# Patient Record
Sex: FEMALE | Race: OTHER | URBAN - METROPOLITAN AREA
[De-identification: names, ages, dates, MRNs, and addresses within clinical notes are randomized per-mention and may not be internally consistent; named-entity substitution may affect disease eponyms.]

---

## 2017-05-23 ENCOUNTER — OFFICE VISIT (OUTPATIENT)
Dept: FAMILY MEDICINE CLINIC | Age: 67
End: 2017-05-23

## 2017-05-23 VITALS
DIASTOLIC BLOOD PRESSURE: 70 MMHG | HEART RATE: 67 BPM | TEMPERATURE: 97.6 F | BODY MASS INDEX: 35.22 KG/M2 | SYSTOLIC BLOOD PRESSURE: 130 MMHG | RESPIRATION RATE: 17 BRPM | WEIGHT: 191.4 LBS | OXYGEN SATURATION: 96 % | HEIGHT: 62 IN

## 2017-05-23 DIAGNOSIS — I10 ESSENTIAL HYPERTENSION: ICD-10-CM

## 2017-05-23 DIAGNOSIS — E78.2 MIXED HYPERCHOLESTEROLEMIA AND HYPERTRIGLYCERIDEMIA: Primary | ICD-10-CM

## 2017-05-23 DIAGNOSIS — R73.9 ELEVATED BLOOD SUGAR: ICD-10-CM

## 2017-05-23 DIAGNOSIS — R00.0 SINUS TACHYCARDIA: ICD-10-CM

## 2017-05-23 DIAGNOSIS — Z85.3 HISTORY OF BREAST CANCER: ICD-10-CM

## 2017-05-23 DIAGNOSIS — F41.0 PANIC ATTACK: ICD-10-CM

## 2017-05-23 PROBLEM — Z90.13 S/P BILATERAL MASTECTOMY: Status: ACTIVE | Noted: 2017-05-23

## 2017-05-23 RX ORDER — ALPRAZOLAM 0.5 MG/1
0.5 TABLET ORAL DAILY
COMMUNITY
Start: 2014-12-19 | End: 2017-05-23 | Stop reason: SDUPTHER

## 2017-05-23 RX ORDER — MELOXICAM 7.5 MG/1
7.5 TABLET ORAL DAILY
COMMUNITY
Start: 2016-09-12 | End: 2017-06-07 | Stop reason: ALTCHOICE

## 2017-05-23 RX ORDER — ALPRAZOLAM 0.5 MG/1
TABLET ORAL
COMMUNITY
End: 2017-05-23 | Stop reason: SDUPTHER

## 2017-05-23 RX ORDER — FLAXSEED OIL 1000 MG
1 CAPSULE ORAL DAILY
COMMUNITY
End: 2017-06-07 | Stop reason: ALTCHOICE

## 2017-05-23 RX ORDER — ATORVASTATIN CALCIUM 40 MG/1
40 TABLET, FILM COATED ORAL DAILY
COMMUNITY
Start: 2014-12-16 | End: 2017-07-12 | Stop reason: SDUPTHER

## 2017-05-23 RX ORDER — ATORVASTATIN CALCIUM 40 MG/1
40 TABLET, FILM COATED ORAL DAILY
COMMUNITY
End: 2017-05-23 | Stop reason: SDUPTHER

## 2017-05-23 RX ORDER — ERGOCALCIFEROL 1.25 MG/1
50000 CAPSULE ORAL
COMMUNITY
Start: 2014-12-16 | End: 2017-06-07 | Stop reason: ALTCHOICE

## 2017-05-23 RX ORDER — ALPRAZOLAM 0.5 MG/1
0.5 TABLET ORAL DAILY
Qty: 30 TAB | Refills: 0 | Status: SHIPPED | OUTPATIENT
Start: 2017-05-23

## 2017-05-23 RX ORDER — DILTIAZEM HYDROCHLORIDE 120 MG/1
120 CAPSULE, COATED, EXTENDED RELEASE ORAL DAILY
COMMUNITY
Start: 2016-10-07 | End: 2017-05-23 | Stop reason: SDUPTHER

## 2017-05-23 RX ORDER — HYDROCORTISONE ACETATE 25 MG/1
25 SUPPOSITORY RECTAL AS NEEDED
COMMUNITY
Start: 2014-12-16 | End: 2017-06-07 | Stop reason: ALTCHOICE

## 2017-05-23 RX ORDER — GUAIFENESIN 100 MG/5ML
81 LIQUID (ML) ORAL DAILY
COMMUNITY
Start: 2007-12-12 | End: 2018-07-24 | Stop reason: ALTCHOICE

## 2017-05-23 RX ORDER — VALSARTAN AND HYDROCHLOROTHIAZIDE 320; 25 MG/1; MG/1
1 TABLET, FILM COATED ORAL DAILY
COMMUNITY
Start: 2014-12-16 | End: 2017-07-12 | Stop reason: SDUPTHER

## 2017-05-23 RX ORDER — METAPROTERENOL SULFATE 20 MG
1 TABLET ORAL DAILY
COMMUNITY
End: 2020-10-28

## 2017-05-23 RX ORDER — DILTIAZEM HYDROCHLORIDE 180 MG/1
180 CAPSULE, COATED, EXTENDED RELEASE ORAL DAILY
Qty: 30 CAP | Refills: 0 | Status: SHIPPED | OUTPATIENT
Start: 2017-05-23 | End: 2017-06-07 | Stop reason: SDUPTHER

## 2017-05-23 RX ORDER — ALBUTEROL SULFATE 90 UG/1
2 AEROSOL, METERED RESPIRATORY (INHALATION)
COMMUNITY
Start: 2014-06-23 | End: 2017-12-11 | Stop reason: SDUPTHER

## 2017-05-23 NOTE — MR AVS SNAPSHOT
Visit Information Date & Time Provider Department Dept. Phone Encounter #  
 5/23/2017  1:45 PM Aracelis Capps, 3 [facility] [phone] [id number] Upcoming Health Maintenance Date Due  
 BREAST CANCER SCRN MAMMOGRAM 2/13/2000 FOBT Q 1 YEAR AGE 50-75 2/13/2000 Pneumococcal 65+ Low/Medium Risk (1 of 2 - PCV13) 2/13/2015 MEDICARE YEARLY EXAM 2/13/2015 INFLUENZA AGE 9 TO ADULT 8/1/2017 GLAUCOMA SCREENING Q2Y 8/1/2018 DTaP/Tdap/Td series (2 - Td) 5/23/2027 Allergies as of 5/23/2017  Review Complete On: 5/23/2017 By: Aracelis Capps MD  
  
 Severity Noted Reaction Type Reactions Biaxin [Clarithromycin] Medium 05/23/2017    Unknown (comments) \"spacey\" feeling Sulfa (Sulfonamide Antibiotics) Low 05/23/2017    Other (comments) Headaches Current Immunizations  Never Reviewed No immunizations on file. Not reviewed this visit You Were Diagnosed With   
  
 Codes Comments Mixed hypercholesterolemia and hypertriglyceridemia    -  Primary ICD-10-CM: Y39.0 ICD-9-CM: 272.2 Elevated blood sugar     ICD-10-CM: R73.9 ICD-9-CM: 790.29 History of breast cancer     ICD-10-CM: Z85.3 ICD-9-CM: V10.3 Essential hypertension     ICD-10-CM: I10 
ICD-9-CM: 401.9 Sinus tachycardia     ICD-10-CM: R00.0 ICD-9-CM: 427.89 Vitals BP Pulse Temp Resp Height(growth percentile) Weight(growth percentile) 130/70 (BP 1 Location: Right arm, BP Patient Position: Sitting) 67 97.6 °F (36.4 °C) (Oral) 17 5' 2.25\" (1.581 m) 191 lb 6.4 oz (86.8 kg) SpO2 BMI Smoking Status 96% 34.73 kg/m2 Never Smoker Vitals History BMI and BSA Data Body Mass Index Body Surface Area 34.73 kg/m 2 1.95 m 2 Preferred Pharmacy Pharmacy Name Phone [address] [facility] loboFelisa cortez [facility] [phone] Your Updated Medication List  
  
   
 This list is accurate as of: 5/23/17  2:34 PM.  Always use your most recent med list.  
  
  
  
  
 albuterol 90 mcg/actuation inhaler Commonly known as:  PROVENTIL HFA, VENTOLIN HFA, PROAIR HFA Take 2 Puffs by inhalation every four (4) hours as needed. ALPRAZolam 0.5 mg tablet Commonly known as:   Beck Take 1 Tab by mouth daily. Max Daily Amount: 0.5 mg.  
  
 aspirin 81 mg chewable tablet Take 81 mg by mouth daily. atorvastatin 40 mg tablet Commonly known as:  LIPITOR Take 40 mg by mouth daily. COREG PO Take  by mouth. dilTIAZem  mg ER capsule Commonly known as:  CARDIZEM CD Take 1 Cap by mouth daily. ergocalciferol 50,000 unit capsule Commonly known as:  ERGOCALCIFEROL Take 50,000 Units by mouth every seven (7) days. flaxseed oil 1,000 mg Cap Take 1 Tab by mouth daily. Llvdjxxd-Biph-Ijillg-Hyalur Ac 597-687-60-2 mg Cap Take 1 Tab by mouth daily. hydrocortisone 25 mg Supp Commonly known as:  ANUSOL-HC Insert 25 mg into rectum as needed. meloxicam 7.5 mg tablet Commonly known as:  MOBIC Take 7.5 mg by mouth daily. MULTIVITAMIN & MINERAL FORMULA PO Take 1 Tab by mouth daily. valsartan-hydroCHLOROthiazide 320-25 mg per tablet Commonly known as:  DIOVAN-HCT Take 1 Tab by mouth daily. Prescriptions Printed Refills ALPRAZolam (XANAX) 0.5 mg tablet 0 Sig: Take 1 Tab by mouth daily. Max Daily Amount: 0.5 mg.  
 Class: Print Route: Oral  
  
Prescriptions Sent to Pharmacy Refills  
 dilTIAZem CD (CARDIZEM CD) 180 mg ER capsule 0 Sig: Take 1 Cap by mouth daily. Class: Normal  
 Pharmacy: [address] [facility], [address] [place] [address] Ph #: [phone] Route: Oral  
  
To-Do List   
 05/23/2017 Lab:  CBC W/O DIFF   
  
 05/23/2017 Lab:  HEMOGLOBIN A1C W/O EAG   
  
 05/23/2017 Lab:  LIPID PANEL   
  
 05/23/2017 Lab: METABOLIC PANEL, COMPREHENSIVE Patient Instructions Diltiazem (By mouth) Diltiazem (baj-ZDM-c-zem) Treats high blood pressure and angina (chest pain). This medicine is a calcium channel blocker. Brand Name(s): Cardizem, Cardizem CD, Cardizem LA, Cartia XT, Dilt-XR, Matzim [place], [place], [address],[facility] There may be other brand names for this medicine. When This Medicine Should Not Be Used: This medicine is not right for everyone. Do not use it if you had an allergic reaction to diltiazem or similar medicines. How to Use This Medicine:  
Long Acting Capsule, 12 Hour Capsule, 24 Hour Capsule, Tablet, Long Acting Tablet · Take your medicine as directed. Your dose may need to be changed several times to find what works best for you. · It is best to take this medicine on an empty stomach. · Swallow this medicine whole. Do not crush, break, chew, or open the capsule or tablet. · Missed dose: Take a dose as soon as you remember. If it is almost time for your next dose, wait until then and take a regular dose. Do not take extra medicine to make up for a missed dose. · Store the medicine in a closed container at room temperature, away from heat, moisture, and direct light. Drugs and Foods to Avoid: Ask your doctor or pharmacist before using any other medicine, including over-the-counter medicines, vitamins, and herbal products. · Some medicines can affect how diltiazem works. Tell your doctor if you are using the following: 
¨ Buspirone, carbamazepine, cimetidine, clonidine, cyclosporine, digoxin, ivabradine, lovastatin, midazolam, quinidine, rifampin, simvastatin, triazolam 
¨ Other blood pressure medicine, including a beta-blocker Kassi Snooks Warnings While Using This Medicine: · Tell your doctor if you are pregnant or breastfeeding, or if you have kidney disease, liver disease, or digestion problems. Tell your doctor about all heart problems that you have, including heart failure or rhythm problems. · This medicine may cause the following problems: ¨ Slow heartbeat ¨ Worsening of heart failure ¨ Serious skin reactions · This medicine could lower your blood pressure too much, especially when you first use it or if you are dehydrated. Stand or sit up slowly if you feel lightheaded or dizzy. Do not drive or do anything else that could be dangerous until you know how this medicine affects you. · Do not stop using this medicine without asking your doctor, even if you feel well. This medicine will not cure high blood pressure, but it will help keep it in normal range. You may have to take blood pressure medicine for the rest of your life. · Your doctor will do lab tests at regular visits to check on the effects of this medicine. Keep all appointments. · Keep all medicine out of the reach of children. Never share your medicine with anyone. Possible Side Effects While Using This Medicine:  
Call your doctor right away if you notice any of these side effects: · Allergic reaction: Itching or hives, swelling in your face or hands, swelling or tingling in your mouth or throat, chest tightness, trouble breathing · Blistering, peeling, red skin rash · Dark urine or pale stools, nausea, vomiting, loss of appetite, stomach pain, yellow skin or eyes · Fast, slow, uneven, or pounding heartbeat · Rapid weight gain, swelling in your hands, ankles, or feet If you notice other side effects that you think are caused by this medicine, tell your doctor. Call your doctor for medical advice about side effects. You may report side effects to FDA at 2-030-FDA-8757 © 2017 [address]  Information is for End User's use only and may not be sold, redistributed or otherwise used for commercial purposes. The above information is an  only. It is not intended as medical advice for individual conditions or treatments. Talk to your doctor, nurse or pharmacist before following any medical regimen to see if it is safe and effective for you. Introducing [facility] & HEALTH SERVICES! [employer] introduces House Party patient portal. Now you can access parts of your medical record, email your doctor's office, and request medication refills online. 1. In your internet browser, go to [email or web address] 2. Click on the First Time User? Click Here link in the Sign In box. You will see the New Member Sign Up page. 3. Enter your House Party Access Code exactly as it appears below. You will not need to use this code after youve completed the sign-up process. If you do not sign up before the expiration date, you must request a new code. · House Party Access Code: [email or web address] Expires: 8/21/2017  1:58 PM 
 
4. Enter the last four digits of your Social Security Number (xxxx) and Date of Birth (mm/dd/yyyy) as indicated and click Submit. You will be taken to the next sign-up page. 5. Create a House Party ID. This will be your House Party login ID and cannot be changed, so think of one that is secure and easy to remember. 6. Create a House Party password. You can change your password at any time. 7. Enter your Password Reset Question and Answer. This can be used at a later time if you forget your password. 8. Enter your e-mail address. You will receive e-mail notification when new information is available in [address] 9. Click Sign Up. You can now view and download portions of your medical record. 10. Click the Download Summary menu link to download a portable copy of your medical information. If you have questions, please visit the Frequently Asked Questions section of the [employer] website. Remember, [employer] is NOT to be used for urgent needs. For medical emergencies, dial 911. Now available from your iPhone and Android! Please provide this summary of care documentation to your next provider. Your primary care clinician is listed as Arden Salinas. If you have any questions after today's visit, please call [phone].

## 2017-05-23 NOTE — PROGRESS NOTES
1. Have you been to the ER, urgent care clinic since your last visit? Hospitalized since your last visit? Yes, [facility], transport via nurse to ER Oct 2016      2. Have you seen or consulted any other health care providers outside of the [address] [place] [address] since your last visit? Include any pap smears or colon screening.    Yes, cardiology Dr. [facility] Nov 2016,  Holden Donovan   Last eye exam August 2016 [facility]   , Dr. Marcus Freeman , Dr Zimmerman Feb 2017 GI         Breast Cancer 2013

## 2017-05-23 NOTE — PROGRESS NOTES
Assessment/Plan:    Cy Fu was seen today for establish care. Diagnoses and all orders for this visit:    Mixed hypercholesterolemia and hypertriglyceridemia- cont lipitor.  -     METABOLIC PANEL, COMPREHENSIVE; Future  -     LIPID PANEL; Future    Elevated blood sugar-ck G6T  -     METABOLIC PANEL, COMPREHENSIVE; Future  -     HEMOGLOBIN A1C W/O EAG; Future    History of breast cancer s/p bilat mastectomy femara/tamoxifen. No xrt. Essential hypertension- intol of BB secondary to sweating and fatigue. Will try cardizem with slow wean off coreg. F/u in 2 weeks. Monitor bp at home.   -     CBC W/O DIFF; Future  -     dilTIAZem CD (CARDIZEM CD) 180 mg ER capsule; Take 1 Cap by mouth daily. Sinus tachycardia  - will try dilt. Panic attack  -     ALPRAZolam (XANAX) 0.5 mg tablet; Take 1 Tab by mouth daily. Max Daily Amount: 0.5 mg. The plan was discussed with the patient. The patient verbalized understanding and is in agreement with the plan. All medication potential side effects were discussed with the patient. Health Maintenance: colo -Dr. Shanae Connor 2015. Z3M.- get records  Health Maintenance   Topic Date Due    COLON CANCER SCRN (BARIUM / CT COLO / FLX SIG) Q5  02/13/2000    Pneumococcal 65+ Low/Medium Risk (1 of 2 - PCV13) 02/13/2015    MEDICARE YEARLY EXAM  02/13/2015    INFLUENZA AGE 9 TO ADULT  08/01/2017    GLAUCOMA SCREENING Q2Y  08/01/2018    DTaP/Tdap/Td series (2 - Td) 05/23/2027    Hepatitis C Screening  Completed    OSTEOPOROSIS SCREENING (DEXA)  Completed    ZOSTER VACCINE AGE 60>  Completed       Edgar Giraldo is a 79 y.o.  female and presents with Establish Care     Subjective:  Pt is here to establish care. H/o breast cancer - invasive lobular carcinoma. S/p tamoxifen and femara. and bilat mastectomy. No xrt. Mixed HLD - on lipitor. Osteopenia - due for dexa 12/2017    HTN - on coreg. Has h/o sinus tachy.  Intol of metoprolol and the afib started when she d/c'd it. Saw cards (Dr. Kyrie Lozano) and was started on coreg, but is having sweats in the am and in pm after taking the doses. Saw a GI doctor for the sensation of \"sitting on a golf ball\". States she was told she has skin tags    Anxiety - sleeps well. She describes panic attack like episodes. Very infrequent. Takes xanax prn. .     ROS:  Constitutional: No recent weight change. No weakness/fatigue. No f/c. Skin: No rashes, change in nails/hair, itching   HENT: No HA, dizziness. No hearing loss/tinnitus. No nasal congestion/discharge. Eyes: No change in vision, double/blurred vision or eye pain/redness. Cardiovascular: No CP/palpitations. No STAHL/orthopnea/PND. Respiratory: No cough/sputum, dyspnea, wheezing. Gastointestinal: No dysphagia, reflux. No n/v. No constipation/diarrhea. No melena/rectal bleeding. Genitourinary: No dysuria, urinary hesitancy, nocturia, hematuria. No incontinence. Musculoskeletal: + joint pain/no stiffness. No muscle pain/tenderness. Endo: No heat/cold intolerance, no polyuria/polydypsia. Heme: No h/o anemia. No easy bleeding/bruising. Allergy/Immunology: No seasonal rhinitis. Denies frequent colds, sinus/ear infections. Neurological: No seizures/numbness/weakness. No paresthesias. Psychiatric:  No depression, +anxiety.    PMH:  Past Medical History:   Diagnosis Date    Arthritis     Asthma     Cancer ([address] 2013    breast cancer     Hypercholesterolemia     Hypertension        PSH:  Past Surgical History:   Procedure Laterality Date    BREAST SURGERY PROCEDURE UNLISTED  06/01/2013    HX CHOLECYSTECTOMY  1995    HX GYN  06/2013    masectomoy and reconstruction    HX GYN  2005    lap hysterectomy and lift     HX HEENT  1962    tonsillectomy    HX ORTHOPAEDIC  11/01/2016    Dr Gene Maria finger ganglion cyst     HX ORTHOPAEDIC  1999    bunion surgery         SH:  Social History   Substance Use Topics    Smoking status: Never Smoker    Smokeless tobacco: Never Used    Alcohol use Yes      Comment: social      FH:  Family History   Problem Relation Age of Onset    Hypertension Mother     Kidney Disease Mother     Cancer Father     Heart Disease Brother        Medications/Allergies:    Current Outpatient Prescriptions:     CARVEDILOL (COREG PO), Take  by mouth., Disp: , Rfl:     atorvastatin (LIPITOR) 40 mg tablet, Take 40 mg by mouth daily. , Disp: , Rfl:     ALPRAZolam (XANAX) 0.5 mg tablet, Take  by mouth nightly as needed for Anxiety. , Disp: , Rfl:     VALSARTAN (DIOVAN PO), Take  by mouth., Disp: , Rfl:   Allergies   Allergen Reactions    Biaxin [Clarithromycin] Unknown (comments)     \"spacey\" feeling      Sulfa (Sulfonamide Antibiotics) Other (comments)     Headaches        Objective:  Visit Vitals    /70 (BP 1 Location: Right arm, BP Patient Position: Sitting)    Pulse 67    Temp 97.6 °F (36.4 °C) (Oral)    Resp 17    Ht 5' 2.25\" (1.581 m)    Wt 191 lb 6.4 oz (86.8 kg)    SpO2 96%    BMI 34.73 kg/m2      Constitutional: Well developed, nourished, no distress, alert, obese habitus   HENT: Exterior ears and tympanic membranes normal bilaterally. Supple neck. No thyromegaly or lymphadenopathy. Oropharynx clear and moist mucous membranes. Eyes: Conjunctiva normal. PERRL. Cardiovascular: S1, S2.  RRR. No murmurs/rubs. No thrills palpated. No carotid bruits. Intact distal pulses. No edema. Pulmonary/Chest Wall: No abnormalities on inspection. Clear to auscultation bilaterally. No wheezing/rhonchi. Normal effort. GI: Soft, nontender, nondistended. Normal active bowel sounds. No  masses on palpation. No hepatosplenomegaly. Musculoskeletal: Gait normal.  Joints without deformity/tenderness. Neurological: Appropriate. No focal motor or sensory deficits. Speech normal.   Skin: No lesions/rashes on inspection. Psych: Appropriate affect, judgement and insight. Short-term memory intact.        Reviewed tests in [facility]  Stress test and echo nml 10/2016.

## 2017-05-23 NOTE — PATIENT INSTRUCTIONS
Diltiazem (By mouth)   Diltiazem (vmu-JKD-e-zem)  Treats high blood pressure and angina (chest pain). This medicine is a calcium channel blocker. Brand Name(s): Cardizem, Cardizem CD, Cardizem LA, Cartia XT, Dilt-XR, Matzim LA, Taztia XT, Tiazac   There may be other brand names for this medicine. When This Medicine Should Not Be Used: This medicine is not right for everyone. Do not use it if you had an allergic reaction to diltiazem or similar medicines. How to Use This Medicine:   Long Acting Capsule, 12 Hour Capsule, 24 Hour Capsule, Tablet, Long Acting Tablet  · Take your medicine as directed. Your dose may need to be changed several times to find what works best for you. · It is best to take this medicine on an empty stomach. · Swallow this medicine whole. Do not crush, break, chew, or open the capsule or tablet. · Missed dose: Take a dose as soon as you remember. If it is almost time for your next dose, wait until then and take a regular dose. Do not take extra medicine to make up for a missed dose. · Store the medicine in a closed container at room temperature, away from heat, moisture, and direct light. Drugs and Foods to Avoid:   Ask your doctor or pharmacist before using any other medicine, including over-the-counter medicines, vitamins, and herbal products. · Some medicines can affect how diltiazem works. Tell your doctor if you are using the following:  ¨ Buspirone, carbamazepine, cimetidine, clonidine, cyclosporine, digoxin, ivabradine, lovastatin, midazolam, quinidine, rifampin, simvastatin, triazolam  ¨ Other blood pressure medicine, including a beta-blocker  . Warnings While Using This Medicine:   · Tell your doctor if you are pregnant or breastfeeding, or if you have kidney disease, liver disease, or digestion problems. Tell your doctor about all heart problems that you have, including heart failure or rhythm problems.   · This medicine may cause the following problems:  ¨ Slow heartbeat  ¨ Worsening of heart failure  ¨ Serious skin reactions  · This medicine could lower your blood pressure too much, especially when you first use it or if you are dehydrated. Stand or sit up slowly if you feel lightheaded or dizzy. Do not drive or do anything else that could be dangerous until you know how this medicine affects you. · Do not stop using this medicine without asking your doctor, even if you feel well. This medicine will not cure high blood pressure, but it will help keep it in normal range. You may have to take blood pressure medicine for the rest of your life. · Your doctor will do lab tests at regular visits to check on the effects of this medicine. Keep all appointments. · Keep all medicine out of the reach of children. Never share your medicine with anyone. Possible Side Effects While Using This Medicine:   Call your doctor right away if you notice any of these side effects:  · Allergic reaction: Itching or hives, swelling in your face or hands, swelling or tingling in your mouth or throat, chest tightness, trouble breathing  · Blistering, peeling, red skin rash  · Dark urine or pale stools, nausea, vomiting, loss of appetite, stomach pain, yellow skin or eyes  · Fast, slow, uneven, or pounding heartbeat  · Rapid weight gain, swelling in your hands, ankles, or feet  If you notice other side effects that you think are caused by this medicine, tell your doctor. Call your doctor for medical advice about side effects. You may report side effects to FDA at 9-788-FDA-8545  © 2017 [facility] Information is for End User's use only and may not be sold, redistributed or otherwise used for commercial purposes. The above information is an  only. It is not intended as medical advice for individual conditions or treatments. Talk to your doctor, nurse or pharmacist before following any medical regimen to see if it is safe and effective for you.

## 2017-06-07 ENCOUNTER — OFFICE VISIT (OUTPATIENT)
Dept: FAMILY MEDICINE CLINIC | Age: 67
End: 2017-06-07

## 2017-06-07 VITALS
RESPIRATION RATE: 18 BRPM | DIASTOLIC BLOOD PRESSURE: 84 MMHG | TEMPERATURE: 98.7 F | WEIGHT: 189 LBS | HEART RATE: 86 BPM | OXYGEN SATURATION: 99 % | BODY MASS INDEX: 34.78 KG/M2 | SYSTOLIC BLOOD PRESSURE: 138 MMHG | HEIGHT: 62 IN

## 2017-06-07 DIAGNOSIS — I10 ESSENTIAL HYPERTENSION: Primary | ICD-10-CM

## 2017-06-07 DIAGNOSIS — J06.9 VIRAL URI: ICD-10-CM

## 2017-06-07 DIAGNOSIS — R00.0 SINUS TACHYCARDIA: ICD-10-CM

## 2017-06-07 RX ORDER — DILTIAZEM HYDROCHLORIDE 240 MG/1
240 CAPSULE, COATED, EXTENDED RELEASE ORAL DAILY
Qty: 90 CAP | Refills: 0 | Status: SHIPPED | OUTPATIENT
Start: 2017-06-07 | End: 2017-07-12 | Stop reason: SDUPTHER

## 2017-06-07 NOTE — MR AVS SNAPSHOT
Visit Information Date & Time Provider Department Dept. Phone Encounter #  
 6/7/2017 10:45 AM Pat Phillips, 3 [facility] [phone] [id number] Upcoming Health Maintenance Date Due Pneumococcal 65+ Low/Medium Risk (1 of 2 - PCV13) 2/13/2015 MEDICARE YEARLY EXAM 2/13/2015 INFLUENZA AGE 9 TO ADULT 8/1/2017 GLAUCOMA SCREENING Q2Y 8/1/2018 COLON CANCER SCRN (BARIUM / CT COLO / FLX SIG) Q5 4/2/2019 DTaP/Tdap/Td series (2 - Td) 5/23/2027 Allergies as of 6/7/2017  Review Complete On: 6/7/2017 By: Pat Phillips MD  
  
 Severity Noted Reaction Type Reactions Biaxin [Clarithromycin] Medium 05/23/2017    Unknown (comments) \"spacey\" feeling Sulfa (Sulfonamide Antibiotics) Low 05/23/2017    Other (comments) Headaches Current Immunizations  Never Reviewed Name Date Pneumococcal Polysaccharide (PPSV-23) 7/11/2013 Zoster Vaccine, Live 8/23/2012 Not reviewed this visit You Were Diagnosed With   
  
 Codes Comments Sinus tachycardia    -  Primary ICD-10-CM: R00.0 ICD-9-CM: 427.89 Essential hypertension     ICD-10-CM: I10 
ICD-9-CM: 401.9 Vitals BP Pulse Temp Resp Height(growth percentile) Weight(growth percentile) 138/84 86 98.7 °F (37.1 °C) 18 5' 2.25\" (1.581 m) 189 lb (85.7 kg) SpO2 BMI OB Status Smoking Status 99% 34.29 kg/m2 Postmenopausal Never Smoker Vitals History BMI and BSA Data Body Mass Index Body Surface Area  
 34.29 kg/m 2 1.94 m 2 Preferred Pharmacy Pharmacy Name Phone [address] [facility] Fel[place] [facility] [phone] Your Updated Medication List  
  
   
This list is accurate as of: 6/7/17 11:03 AM.  Always use your most recent med list.  
  
  
  
  
 albuterol 90 mcg/actuation inhaler Commonly known as:  PROVENTIL HFA, VENTOLIN HFA, PROAIR HFA Take 2 Puffs by inhalation every four (4) hours as needed. ALPRAZolam 0.5 mg tablet Commonly known as:  [place] Take 1 Tab by mouth daily. Max Daily Amount: 0.5 mg.  
  
 aspirin 81 mg chewable tablet Take 81 mg by mouth daily. atorvastatin 40 mg tablet Commonly known as:  LIPITOR Take 40 mg by mouth daily. dilTIAZem  mg ER capsule Commonly known as:  CARDIZEM CD Take 1 Cap by mouth daily. Vguhahny-Aoab-Lhyqmo-Hyalur Ac 767-946-61-2 mg Cap Take 1 Tab by mouth daily. MULTIVITAMIN & MINERAL FORMULA PO Take 1 Tab by mouth daily. valsartan-hydroCHLOROthiazide 320-25 mg per tablet Commonly known as:  DIOVAN-HCT Take 1 Tab by mouth daily. Prescriptions Sent to Pharmacy Refills  
 dilTIAZem CD (CARDIZEM CD) 240 mg ER capsule 0 Sig: Take 1 Cap by mouth daily. Class: Normal  
 Pharmacy: [address] [place] [address], [id number] [place] [address] #: [phone] Route: Oral  
  
Introducing \[facility]\"" & HEALTH SERVICES! Dear Denisse Willosn: Thank you for requesting a DinnerTime account. Our records indicate that you already have an active DinnerTime account. You can access your account anytime at [email or web address] Did you know that you can access your hospital and ER discharge instructions at any time in DinnerTime? You can also review all of your test results from your hospital stay or ER visit. Additional Information If you have questions, please visit the Frequently Asked Questions section of the DinnerTime website at [email or web address] Remember, DinnerTime is NOT to be used for urgent needs. For medical emergencies, dial 911. Now available from your iPhone and Android! Please provide this summary of care documentation to your next provider. Your primary care clinician is listed as Arden Salinas. If you have any questions after today's visit, please call [phone].

## 2017-06-07 NOTE — PROGRESS NOTES
Alysa Macias is a 79 y.o. female  Here today for follow-up. Patient also have complaints of cold symptoms        1. Have you been to the ER, urgent care clinic since your last visit? Hospitalized since your last visit? No    2. Have you seen or consulted any other health care providers outside of the [address] [place] [address] since your last visit? Include any pap smears or colon screening.  No

## 2017-06-07 NOTE — PROGRESS NOTES
Assessment/Plan:    1. Essential hypertension and sinus tachy  -incr dose dilt to 240mg. F/u in1 mo. Stop coreg given bb intolerance. - dilTIAZem CD (CARDIZEM CD) 240 mg ER capsule; Take 1 Cap by mouth daily. Dispense: 90 Cap; Refill: 0    2. Viral URI  -no indication for antibiotics at this time. Monitor. The plan was discussed with the patient. The patient verbalized understanding and is in agreement with the plan. All medication potential side effects were discussed with the patient. Health Maintenance:   Health Maintenance   Topic Date Due    Pneumococcal 65+ Low/Medium Risk (1 of 2 - PCV13) 02/13/2015    MEDICARE YEARLY EXAM  02/13/2015    INFLUENZA AGE 9 TO ADULT  08/01/2017    GLAUCOMA SCREENING Q2Y  08/01/2018    COLON CANCER SCRN (BARIUM / CT COLO / FLX SIG) Q5  04/02/2019    DTaP/Tdap/Td series (2 - Td) 05/23/2027    Hepatitis C Screening  Completed    OSTEOPOROSIS SCREENING (DEXA)  Completed    ZOSTER VACCINE AGE 60>  Completed       Cristina Nix is a 79 y.o. female and presents with Follow-up     Subjective:  HTN  And sinus tachy -was switched from BB to dilt at last visit b/c of intolerance to BB (failed coreg and metoprolol). The sx of fatigue and sweating has improved with decreased dose of coreg (we started slow wean last visit)    Notes wheezing, minimal cough, fatigue, sore throat x several days. +subjective chills. ROS:  Constitutional: No recent weight change. No weakness/fatigue. No f/c. HENT: No HA, dizziness. No hearing loss/tinnitus. No nasal congestion/discharge. Eyes: No change in vision, double/blurred vision or eye pain/redness. Cardiovascular: No CP/palpitations. No STAHL/orthopnea/PND. Respiratory: No cough/sputum, no dyspnea, +wheezing. The problem list was updated as a part of today's visit.   Patient Active Problem List   Diagnosis Code    History of breast cancer Z85.3    Mixed hypercholesterolemia and hypertriglyceridemia E78.2    Elevated blood sugar R73.9    Essential hypertension I10    Sinus tachycardia R00.0    S/P bilateral mastectomy Z90.13     The PSH, FH were reviewed. SH:  Social History   Substance Use Topics    Smoking status: Never Smoker    Smokeless tobacco: Never Used    Alcohol use Yes      Comment: social      Medications/Allergies:  Current Outpatient Prescriptions on File Prior to Visit   Medication Sig Dispense Refill    CARVEDILOL (COREG PO) Take  by mouth.  albuterol (PROVENTIL HFA, VENTOLIN HFA, PROAIR HFA) 90 mcg/actuation inhaler Take 2 Puffs by inhalation every four (4) hours as needed.  aspirin 81 mg chewable tablet Take 81 mg by mouth daily.  ergocalciferol (ERGOCALCIFEROL) 50,000 unit capsule Take 50,000 Units by mouth every seven (7) days.  hydrocortisone (ANUSOL-HC) 25 mg supp Insert 25 mg into rectum as needed.  Jcsjjfvk-Qhbd-Uyhcbz-Hyalur Ac 532-057-36-2 mg cap Take 1 Tab by mouth daily.  meloxicam (MOBIC) 7.5 mg tablet Take 7.5 mg by mouth daily.  valsartan-hydroCHLOROthiazide (DIOVAN-HCT) 320-25 mg per tablet Take 1 Tab by mouth daily.  atorvastatin (LIPITOR) 40 mg tablet Take 40 mg by mouth daily.  MULTIVITAMIN WITH MINERALS (MULTIVITAMIN & MINERAL FORMULA PO) Take 1 Tab by mouth daily.  flaxseed oil 1,000 mg cap Take 1 Tab by mouth daily.  dilTIAZem CD (CARDIZEM CD) 180 mg ER capsule Take 1 Cap by mouth daily. 30 Cap 0    ALPRAZolam (XANAX) 0.5 mg tablet Take 1 Tab by mouth daily. Max Daily Amount: 0.5 mg. 30 Tab 0     No current facility-administered medications on file prior to visit.          Allergies   Allergen Reactions    Biaxin [Clarithromycin] Unknown (comments)     \"spacey\" feeling      Sulfa (Sulfonamide Antibiotics) Other (comments)     Headaches        Objective:  Visit Vitals    /84    Pulse 86    Temp 98.7 °F (37.1 °C)    Resp 18    Ht 5' 2.25\" (1.581 m)    Wt 189 lb (85.7 kg)    SpO2 99%    BMI 34.29 kg/m2 Constitutional: Well developed, nourished, no distress, alert   HENT: Exterior ears and tympanic membranes normal bilaterally. Supple neck. No thyromegaly or lymphadenopathy. Oropharynx clear and moist mucous membranes. Eyes: Conjunctiva normal. PERRL. CV: S1, S2.  RRR. No murmurs/rubs. No thrills palpated. No carotid bruits. Intact distal pulses. No edema. Pulm: No abnormalities on inspection. Clear to auscultation bilaterally. No wheezing/rhonchi. Normal effort.

## 2017-06-20 PROBLEM — E55.9 VITAMIN D DEFICIENCY: Status: ACTIVE | Noted: 2017-06-20

## 2017-07-07 ENCOUNTER — OFFICE VISIT (OUTPATIENT)
Dept: FAMILY MEDICINE CLINIC | Age: 67
End: 2017-07-07

## 2017-07-07 VITALS
DIASTOLIC BLOOD PRESSURE: 81 MMHG | RESPIRATION RATE: 20 BRPM | OXYGEN SATURATION: 94 % | BODY MASS INDEX: 34.96 KG/M2 | HEART RATE: 102 BPM | SYSTOLIC BLOOD PRESSURE: 140 MMHG | HEIGHT: 62 IN | WEIGHT: 190 LBS | TEMPERATURE: 98.3 F

## 2017-07-07 DIAGNOSIS — Z00.00 ROUTINE GENERAL MEDICAL EXAMINATION AT A HEALTH CARE FACILITY: ICD-10-CM

## 2017-07-07 DIAGNOSIS — Z13.39 SCREENING FOR ALCOHOLISM: ICD-10-CM

## 2017-07-07 DIAGNOSIS — J45.21 RAD (REACTIVE AIRWAY DISEASE) WITH WHEEZING, MILD INTERMITTENT, WITH ACUTE EXACERBATION: Primary | ICD-10-CM

## 2017-07-07 DIAGNOSIS — R00.0 SINUS TACHYCARDIA: ICD-10-CM

## 2017-07-07 DIAGNOSIS — I10 ESSENTIAL HYPERTENSION: ICD-10-CM

## 2017-07-07 RX ORDER — PREDNISONE 20 MG/1
40 TABLET ORAL
Qty: 10 TAB | Refills: 0 | Status: SHIPPED | OUTPATIENT
Start: 2017-07-07 | End: 2017-07-12

## 2017-07-07 NOTE — PATIENT INSTRUCTIONS
Medicare Part B Preventive Services Limitations Recommendation   Bone Mass Measurement  (age 72 & older, biennial) Requires diagnosis related to osteoporosis or estrogen deficiency. Biennial benefit unless patient has history of long-term glucocorticoid tx or baseline is needed because initial test was by other method 12/2017   Cardiovascular Screening Blood Tests (every 5 years)  Total cholesterol, HDL, Triglycerides Order as a panel if possible due   Colorectal Cancer Screening  -Fecal occult blood test (annual)  -Flexible sigmoidoscopy (5y)  -Screening colonoscopy (10y)  -Barium Enema  2019   Diabetes Screening Tests (at least every 3 years, Medicare covers annually or at 6-month intervals for prediabetic patients)    Fasting blood sugar (FBS) or glucose tolerance test (GTT) Patient must be diagnosed with one of the following:  -Hypertension, Dyslipidemia, obesity, previous impaired FBS or GTT  Or any two of the following: overweight, FH of diabetes, age ? 72, history of gestational diabetes, birth of baby weighing more than 9 pounds due   Pneumococcal Vaccination (once after 72)  Get prevnar 15 from pharmacy

## 2017-07-07 NOTE — PROGRESS NOTES
Chief Complaint   Patient presents with   [address] [place] [address] Annual Wellness Visit     1. Have you been to the ER, urgent care clinic since your last visit? Hospitalized since your last visit? No    2. Have you seen or consulted any other health care providers outside of the [address] [place] [address] since your last visit? Include any pap smears or colon screening.  No

## 2017-07-07 NOTE — MR AVS SNAPSHOT
Visit Information Date & Time Provider Department Dept. Phone Encounter #  
 7/7/2017 10:45 AM Patrick Lares, [id number] [phone] [id number] Upcoming Health Maintenance Date Due Pneumococcal 65+ Low/Medium Risk (1 of 2 - PCV13) 2/13/2015 INFLUENZA AGE 9 TO ADULT 8/1/2017 MEDICARE YEARLY EXAM 7/8/2018 GLAUCOMA SCREENING Q2Y 8/1/2018 COLON CANCER SCRN (BARIUM / CT COLO / FLX SIG) Q5 4/2/2019 DTaP/Tdap/Td series (2 - Td) 5/23/2027 Allergies as of 7/7/2017  Review Complete On: 7/7/2017 By: Patrick Lares MD  
  
 Severity Noted Reaction Type Reactions Biaxin [Clarithromycin] Medium 05/23/2017    Unknown (comments) \"spacey\" feeling Sulfa (Sulfonamide Antibiotics) Low 05/23/2017    Other (comments) Headaches Current Immunizations  Never Reviewed Name Date Pneumococcal Polysaccharide (PPSV-23) 7/11/2013 Zoster Vaccine, Live 8/23/2012 Not reviewed this visit You Were Diagnosed With   
  
 Codes Comments RAD (reactive airway disease) with wheezing, mild intermittent, with acute exacerbation    -  Primary ICD-10-CM: J45.21 ICD-9-CM: 894.63 Routine general medical examination at a health care facility     ICD-10-CM: Z00.00 ICD-9-CM: V70.0 Screening for alcoholism     ICD-10-CM: Z13.89 ICD-9-CM: V79.1 Essential hypertension     ICD-10-CM: I10 
ICD-9-CM: 401.9 Sinus tachycardia     ICD-10-CM: R00.0 ICD-9-CM: 427.89 Vitals BP Pulse Temp Resp Height(growth percentile) Weight(growth percentile) 140/81 (BP 1 Location: Left arm, BP Patient Position: Sitting) (!) 102 98.3 °F (36.8 °C) (Oral) 20 5' 2.25\" (1.581 m) 190 lb (86.2 kg) SpO2 BMI OB Status Smoking Status 94% 34.47 kg/m2 Postmenopausal Never Smoker Vitals History BMI and BSA Data Body Mass Index Body Surface Area 34.47 kg/m 2 1.95 m 2 [facility] [address] [facility] Giovana Villagomez [phone] Your Updated Medication List  
  
   
This list is accurate as of: 7/7/17 11:28 AM.  Always use your most recent med list.  
  
  
  
  
 albuterol 90 mcg/actuation inhaler Commonly known as:  PROVENTIL HFA, VENTOLIN HFA, PROAIR HFA Take 2 Puffs by inhalation every four (4) hours as needed. ALPRAZolam 0.5 mg tablet Commonly known as:  Lamont Ramos Take 1 Tab by mouth daily. Max Daily Amount: 0.5 mg.  
  
 aspirin 81 mg chewable tablet Take 81 mg by mouth daily. atorvastatin 40 mg tablet Commonly known as:  LIPITOR Take 40 mg by mouth daily. dilTIAZem  mg ER capsule Commonly known as:  CARDIZEM CD Take 1 Cap by mouth daily. Qzqfgtvz-Ripv-Efuwwq-Hyalur Ac 207-958-05-2 mg Cap Take 1 Tab by mouth daily. MULTIVITAMIN & MINERAL FORMULA PO Take 1 Tab by mouth daily. predniSONE 20 mg tablet Commonly known as:  Darene Reels Take 2 Tabs by mouth daily (with breakfast) for 5 days. valsartan-hydroCHLOROthiazide 320-25 mg per tablet Commonly known as:  DIOVAN-HCT Take 1 Tab by mouth daily. Prescriptions Sent to Pharmacy Refills  
 predniSONE (DELTASONE) 20 mg tablet 0 Sig: Take 2 Tabs by mouth daily (with breakfast) for 5 days. Class: Normal  
 Pharmacy: [address] [place] [address], [id number] [place] [address] Ph #: [phone] Route: Oral  
  
Patient Instructions Medicare Part B Preventive Services Limitations Recommendation Bone Mass Measurement 
(age 72 & older, biennial) Requires diagnosis related to osteoporosis or estrogen deficiency. Biennial benefit unless patient has history of long-term glucocorticoid tx or baseline is needed because initial test was by other method 12/2017 Cardiovascular Screening Blood Tests (every 5 years) Total cholesterol, HDL, Triglycerides Order as a panel if possible due Colorectal Cancer Screening 
-Fecal occult blood test (annual) -Flexible sigmoidoscopy (5y) 
-Screening colonoscopy (10y) -Barium Enema  2019 Diabetes Screening Tests (at least every 3 years, Medicare covers annually or at 6-month intervals for prediabetic patients) Fasting blood sugar (FBS) or glucose tolerance test (GTT) Patient must be diagnosed with one of the following: 
-Hypertension, Dyslipidemia, obesity, previous impaired FBS or GTT 
Or any two of the following: overweight, FH of diabetes, age ? 72, history of gestational diabetes, birth of baby weighing more than 9 pounds due Pneumococcal Vaccination (once after 65)  Get prevnar 13 from pharmacy [facility]! Dear Stacia Lance: Thank you for requesting a [employer] account. Our records indicate that you already have an active [employer] account. You can access your account anytime at [email or web address] Did you know that you can access your hospital and ER discharge instructions at any time in [employer]? You can also review all of your test results from your hospital stay or ER visit. Additional Information If you have questions, please visit the Frequently Asked Questions section of the [employer] website at [email or web address] Remember, [employer] is NOT to be used for urgent needs. For medical emergencies, dial 911. Now available from your iPhone and Android! Please provide this summary of care documentation to your next provider. Your primary care clinician is listed as Arden Salinas. If you have any questions after today's visit, please call [phone].

## 2017-07-07 NOTE — PROGRESS NOTES
This is an Initial Medicare Annual Wellness Exam (AWV) (Performed 12 months after IPPE or effective date of Medicare Part B enrollment, Once in a lifetime)    I have reviewed the patient's medical history in detail and updated the computerized patient record. History     Past Medical History:   Diagnosis Date    Arthritis     Asthma     Cancer ([address] 2013    breast cancer     Hypercholesterolemia     Hypertension       Past Surgical History:   Procedure Laterality Date    BREAST SURGERY PROCEDURE UNLISTED  06/01/2013    HX CHOLECYSTECTOMY  1995    HX GYN  06/2013    masectomoy and reconstruction    HX GYN  2005    lap hysterectomy and lift     HX HEENT  1962    tonsillectomy    HX ORTHOPAEDIC  11/01/2016    Dr Tiffanie Lopez finger ganglion cyst     HX ORTHOPAEDIC  1999    bunion surgery      Current Outpatient Prescriptions   Medication Sig Dispense Refill    dilTIAZem CD (CARDIZEM CD) 240 mg ER capsule Take 1 Cap by mouth daily. 90 Cap 0    albuterol (PROVENTIL HFA, VENTOLIN HFA, PROAIR HFA) 90 mcg/actuation inhaler Take 2 Puffs by inhalation every four (4) hours as needed.  aspirin 81 mg chewable tablet Take 81 mg by mouth daily.  Eapwacrz-Fhwd-Sngkzh-Hyalur Ac 253-022-34-2 mg cap Take 1 Tab by mouth daily.  valsartan-hydroCHLOROthiazide (DIOVAN-HCT) 320-25 mg per tablet Take 1 Tab by mouth daily.  atorvastatin (LIPITOR) 40 mg tablet Take 40 mg by mouth daily.  MULTIVITAMIN WITH MINERALS (MULTIVITAMIN & MINERAL FORMULA PO) Take 1 Tab by mouth daily.  ALPRAZolam (XANAX) 0.5 mg tablet Take 1 Tab by mouth daily.  Max Daily Amount: 0.5 mg. 30 Tab 0     Allergies   Allergen Reactions    Biaxin [Clarithromycin] Unknown (comments)     \"spacey\" feeling      Sulfa (Sulfonamide Antibiotics) Other (comments)     Headaches      Family History   Problem Relation Age of Onset    Hypertension Mother     Kidney Disease Mother     Cancer Father     Heart Disease Brother      Social History   Substance Use Topics    Smoking status: Never Smoker    Smokeless tobacco: Never Used    Alcohol use Yes      Comment: social      Patient Active Problem List   Diagnosis Code    History of breast cancer Z85.3    Mixed hypercholesterolemia and hypertriglyceridemia E78.2    Elevated blood sugar R73.9    Essential hypertension I10    Sinus tachycardia R00.0    S/P bilateral mastectomy Z90.13    Vitamin D deficiency E55.9       Depression Risk Factor Screening:     PHQ over the last two weeks 7/7/2017   Little interest or pleasure in doing things Not at all   Feeling down, depressed or hopeless Not at all   Total Score PHQ 2 0     Alcohol Risk Factor Screening: On any occasion during the past 3 months, have you had more than 3 drinks containing alcohol? Yes    Do you average more than 7 drinks per week? No      Functional Ability and Level of Safety:     Hearing Loss   normal-to-mild    Activities of Daily Living   Self-care. Requires assistance with: no ADLs    Fall Risk   Fall Risk Assessment, last 12 mths 6/7/2017   Able to walk? Yes   Fall in past 12 months? No     Abuse Screen   Patient is not abused    Review of Systems   A comprehensive review of systems was negative. Physical Examination     Evaluation of Cognitive Function:  Mood/affect:  neutral  Appearance: age appropriate  Cognition: normal    Patient Care Team:  Allie Andres MD as PCP - General (Internal Medicine)    Advice/Referrals/Counseling   Education and counseling provided:  Are appropriate based on today's review and evaluation  End-of-Life planning (with patient's consent). Advance Care Planning (ACP) Provider Conversation Snapshot    Date of ACP Conversation: 07/07/17  Persons included in Conversation:  patient  Length of ACP Conversation in minutes:  <16 minutes (Non-Billable)    Authorized Decision Maker (if patient is incapable of making informed decisions):    This person is:    (POA) is [phone]        For Patients with Decision Making Capacity:   pt is DNR        Assessment/Plan   pt to bring in living will. Assessment/Plan:    1. RAD (reactive airway disease) with wheezing, mild intermittent, with acute exacerbation  - predniSONE (DELTASONE) 20 mg tablet; Take 2 Tabs by mouth daily (with breakfast) for 5 days. Dispense: 10 Tab; Refill: 0    2. Sinus tachycardia  -go back to dilt 240mg. F/u if palpitations persist.    3. Essential hypertension  -see #2  -get labs    4. Routine general medical examination at a health care facility    5. Screening for alcoholism      The plan was discussed with the patient. The patient verbalized understanding and is in agreement with the plan. All medication potential side effects were discussed with the patient. Health Maintenance:   Health Maintenance   Topic Date Due    Pneumococcal 65+ Low/Medium Risk (1 of 2 - PCV13) 02/13/2015    INFLUENZA AGE 9 TO ADULT  08/01/2017    MEDICARE YEARLY EXAM  07/08/2018    GLAUCOMA SCREENING Q2Y  08/01/2018    COLON CANCER SCRN (BARIUM / CT COLO / FLX SIG) Q5  04/02/2019    DTaP/Tdap/Td series (2 - Td) 05/23/2027    Hepatitis C Screening  Completed    OSTEOPOROSIS SCREENING (DEXA)  Completed    ZOSTER VACCINE AGE 60>  Completed       Elyce Due is a 79 y.o. female and presents with Annual Wellness Visit     Subjective:  HTN - notes on 6/28-6/30 she had episodes of sinus tachy. We had weaned off coreg slowly. Last dose around 6/14. She self-decreased back to 180mg dilt. She is intol of BB. Also notes since getting viral UTI, she's had some wheezing and dyspnea. ROS:  Constitutional: No recent weight change. No weakness/fatigue. No f/c. Cardiovascular: No CP/+palpitations. No STAHL/orthopnea/PND. Respiratory: No cough/sputum, +dyspnea, +wheezing. Gastointestinal: No dysphagia, reflux. No n/v. No constipation/diarrhea. No melena/rectal bleeding.      The problem list was updated as a part of today's visit. Patient Active Problem List   Diagnosis Code    History of breast cancer Z85.3    Mixed hypercholesterolemia and hypertriglyceridemia E78.2    Elevated blood sugar R73.9    Essential hypertension I10    Sinus tachycardia R00.0    S/P bilateral mastectomy Z90.13    Vitamin D deficiency E55.9       The PSH, FH were reviewed. SH:  Social History   Substance Use Topics    Smoking status: Never Smoker    Smokeless tobacco: Never Used    Alcohol use Yes      Comment: social        Medications/Allergies:  Current Outpatient Prescriptions on File Prior to Visit   Medication Sig Dispense Refill    dilTIAZem CD (CARDIZEM CD) 240 mg ER capsule Take 1 Cap by mouth daily. 90 Cap 0    albuterol (PROVENTIL HFA, VENTOLIN HFA, PROAIR HFA) 90 mcg/actuation inhaler Take 2 Puffs by inhalation every four (4) hours as needed.  aspirin 81 mg chewable tablet Take 81 mg by mouth daily.  Hpsjjeza-Oltq-Pqpcni-Hyalur Ac 852-663-49-2 mg cap Take 1 Tab by mouth daily.  valsartan-hydroCHLOROthiazide (DIOVAN-HCT) 320-25 mg per tablet Take 1 Tab by mouth daily.  atorvastatin (LIPITOR) 40 mg tablet Take 40 mg by mouth daily.  MULTIVITAMIN WITH MINERALS (MULTIVITAMIN & MINERAL FORMULA PO) Take 1 Tab by mouth daily.  ALPRAZolam (XANAX) 0.5 mg tablet Take 1 Tab by mouth daily. Max Daily Amount: 0.5 mg. 30 Tab 0     No current facility-administered medications on file prior to visit.          Allergies   Allergen Reactions    Biaxin [Clarithromycin] Unknown (comments)     \"spacey\" feeling      Sulfa (Sulfonamide Antibiotics) Other (comments)     Headaches        Objective:  Visit Vitals    /81 (BP 1 Location: Left arm, BP Patient Position: Sitting)    Pulse (!) 102    Temp 98.3 °F (36.8 °C) (Oral)    Resp 20    Ht 5' 2.25\" (1.581 m)    Wt 190 lb (86.2 kg)    SpO2 94%    BMI 34.47 kg/m2      Constitutional: Well developed, nourished, no distress, alert, obese habitus   HENT: Exterior ears and tympanic membranes normal bilaterally. Supple neck. No thyromegaly or lymphadenopathy. Oropharynx clear and moist mucous membranes. No middle ear effusion. Eyes: Conjunctiva normal. PERRL. CV: S1, S2.  RRR. No murmurs/rubs. No thrills palpated. No carotid bruits. Intact distal pulses. No edema. Pulm: No abnormalities on inspection. Scattered few wheezes. Normal effort.

## 2017-07-12 DIAGNOSIS — I10 ESSENTIAL HYPERTENSION: ICD-10-CM

## 2017-07-12 DIAGNOSIS — E78.2 MIXED HYPERCHOLESTEROLEMIA AND HYPERTRIGLYCERIDEMIA: ICD-10-CM

## 2017-07-12 RX ORDER — DILTIAZEM HYDROCHLORIDE 240 MG/1
240 CAPSULE, COATED, EXTENDED RELEASE ORAL DAILY
Qty: 90 CAP | Refills: 1 | Status: SHIPPED | OUTPATIENT
Start: 2017-07-12 | End: 2018-01-16 | Stop reason: SDUPTHER

## 2017-07-12 RX ORDER — VALSARTAN AND HYDROCHLOROTHIAZIDE 320; 25 MG/1; MG/1
1 TABLET, FILM COATED ORAL DAILY
Qty: 90 TAB | Refills: 1 | Status: SHIPPED | OUTPATIENT
Start: 2017-07-12 | End: 2018-01-22 | Stop reason: ALTCHOICE

## 2017-07-12 RX ORDER — ATORVASTATIN CALCIUM 40 MG/1
40 TABLET, FILM COATED ORAL DAILY
Qty: 90 TAB | Refills: 3 | Status: SHIPPED | OUTPATIENT
Start: 2017-07-12 | End: 2017-10-31 | Stop reason: SDUPTHER

## 2017-07-21 ENCOUNTER — HOSPITAL ENCOUNTER (OUTPATIENT)
Dept: LAB | Age: 67
Discharge: HOME | End: 2017-07-21
Payer: MEDICARE

## 2017-07-21 DIAGNOSIS — R73.9 ELEVATED BLOOD SUGAR: ICD-10-CM

## 2017-07-21 DIAGNOSIS — I10 ESSENTIAL HYPERTENSION: ICD-10-CM

## 2017-07-21 DIAGNOSIS — E78.2 MIXED HYPERCHOLESTEROLEMIA AND HYPERTRIGLYCERIDEMIA: ICD-10-CM

## 2017-07-21 LAB
ALBUMIN SERPL BCP-MCNC: 3.4 G/DL (ref 3.4–5)
ALBUMIN/GLOB SERPL: 1.1 {RATIO} (ref 0.8–1.7)
ALP SERPL-CCNC: 108 U/L (ref 45–117)
ALT SERPL-CCNC: 25 U/L (ref 13–56)
ANION GAP BLD CALC-SCNC: 10 MMOL/L (ref 3–18)
AST SERPL W P-5'-P-CCNC: 11 U/L (ref 15–37)
BILIRUB SERPL-MCNC: 0.4 MG/DL (ref 0.2–1)
BUN SERPL-MCNC: 15 MG/DL (ref 7–18)
BUN/CREAT SERPL: 19 (ref 12–20)
CALCIUM SERPL-MCNC: 9.5 MG/DL (ref 8.5–10.1)
CHLORIDE SERPL-SCNC: 95 MMOL/L (ref 100–108)
CHOLEST SERPL-MCNC: 218 MG/DL
CO2 SERPL-SCNC: 32 MMOL/L (ref 21–32)
CREAT SERPL-MCNC: 0.79 MG/DL (ref 0.6–1.3)
ERYTHROCYTE [DISTWIDTH] IN BLOOD BY AUTOMATED COUNT: 14.5 % (ref 11.6–14.5)
GLOBULIN SER CALC-MCNC: 3.2 G/DL (ref 2–4)
GLUCOSE SERPL-MCNC: 109 MG/DL (ref 74–99)
HBA1C MFR BLD: 5.9 % (ref 4.2–5.6)
HCT VFR BLD AUTO: 36.5 % (ref 35–45)
HDLC SERPL-MCNC: 97 MG/DL (ref 40–60)
HDLC SERPL: 2.2 {RATIO} (ref 0–5)
HGB BLD-MCNC: 12 G/DL (ref 12–16)
LDLC SERPL CALC-MCNC: 100.2 MG/DL (ref 0–100)
LIPID PROFILE,FLP: ABNORMAL
MCH RBC QN AUTO: 31.9 PG (ref 24–34)
MCHC RBC AUTO-ENTMCNC: 32.9 G/DL (ref 31–37)
MCV RBC AUTO: 97.1 FL (ref 74–97)
PLATELET # BLD AUTO: 452 K/UL (ref 135–420)
PMV BLD AUTO: 8.9 FL (ref 9.2–11.8)
POTASSIUM SERPL-SCNC: 4 MMOL/L (ref 3.5–5.5)
PROT SERPL-MCNC: 6.6 G/DL (ref 6.4–8.2)
RBC # BLD AUTO: 3.76 M/UL (ref 4.2–5.3)
SODIUM SERPL-SCNC: 137 MMOL/L (ref 136–145)
TRIGL SERPL-MCNC: 104 MG/DL (ref ?–150)
VLDLC SERPL CALC-MCNC: 20.8 MG/DL
WBC # BLD AUTO: 12.1 K/UL (ref 4.6–13.2)

## 2017-07-21 PROCEDURE — 83036 HEMOGLOBIN GLYCOSYLATED A1C: CPT | Performed by: INTERNAL MEDICINE

## 2017-07-21 PROCEDURE — 36415 COLL VENOUS BLD VENIPUNCTURE: CPT | Performed by: INTERNAL MEDICINE

## 2017-07-21 PROCEDURE — 80061 LIPID PANEL: CPT | Performed by: INTERNAL MEDICINE

## 2017-07-21 PROCEDURE — 80053 COMPREHEN METABOLIC PANEL: CPT | Performed by: INTERNAL MEDICINE

## 2017-07-21 PROCEDURE — 85027 COMPLETE CBC AUTOMATED: CPT | Performed by: INTERNAL MEDICINE

## 2017-10-03 ENCOUNTER — OFFICE VISIT (OUTPATIENT)
Dept: FAMILY MEDICINE CLINIC | Age: 67
End: 2017-10-03

## 2017-10-03 ENCOUNTER — HOSPITAL ENCOUNTER (OUTPATIENT)
Dept: LAB | Age: 67
Discharge: HOME | End: 2017-10-03
Payer: MEDICARE

## 2017-10-03 ENCOUNTER — TELEPHONE (OUTPATIENT)
Dept: FAMILY MEDICINE CLINIC | Age: 67
End: 2017-10-03

## 2017-10-03 VITALS
DIASTOLIC BLOOD PRESSURE: 78 MMHG | WEIGHT: 191 LBS | BODY MASS INDEX: 35.15 KG/M2 | HEART RATE: 76 BPM | HEIGHT: 62 IN | OXYGEN SATURATION: 98 % | RESPIRATION RATE: 20 BRPM | TEMPERATURE: 98.4 F | SYSTOLIC BLOOD PRESSURE: 114 MMHG

## 2017-10-03 DIAGNOSIS — N39.0 URINARY TRACT INFECTION WITHOUT HEMATURIA, SITE UNSPECIFIED: ICD-10-CM

## 2017-10-03 DIAGNOSIS — N39.0 URINARY TRACT INFECTION WITHOUT HEMATURIA, SITE UNSPECIFIED: Primary | ICD-10-CM

## 2017-10-03 LAB
BILIRUB UR QL STRIP: NEGATIVE
GLUCOSE UR-MCNC: NEGATIVE MG/DL
KETONES P FAST UR STRIP-MCNC: NEGATIVE MG/DL
PH UR STRIP: 6 [PH] (ref 4.6–8)
PROT UR QL STRIP: NORMAL MG/DL
SP GR UR STRIP: 1.01 (ref 1–1.03)
UA UROBILINOGEN AMB POC: NORMAL (ref 0.2–1)
URINALYSIS CLARITY POC: CLEAR
URINALYSIS COLOR POC: YELLOW
URINE BLOOD POC: NEGATIVE
URINE LEUKOCYTES POC: NORMAL
URINE NITRITES POC: POSITIVE

## 2017-10-03 PROCEDURE — 87086 URINE CULTURE/COLONY COUNT: CPT | Performed by: NURSE PRACTITIONER

## 2017-10-03 PROCEDURE — 87077 CULTURE AEROBIC IDENTIFY: CPT | Performed by: NURSE PRACTITIONER

## 2017-10-03 PROCEDURE — 87186 SC STD MICRODIL/AGAR DIL: CPT | Performed by: NURSE PRACTITIONER

## 2017-10-03 RX ORDER — MONTELUKAST SODIUM 10 MG/1
10 TABLET ORAL DAILY
COMMUNITY
Start: 2017-09-01 | End: 2018-07-24 | Stop reason: ALTCHOICE

## 2017-10-03 RX ORDER — CIPROFLOXACIN 500 MG/1
500 TABLET ORAL 2 TIMES DAILY
Qty: 6 TAB | Refills: 0 | Status: SHIPPED | OUTPATIENT
Start: 2017-10-03 | End: 2017-10-06

## 2017-10-03 NOTE — MR AVS SNAPSHOT
Visit Information Date & Time Provider Department Dept. Phone Encounter #  
 10/3/2017  9:40 AM Rae Roe [facility] [phone] [id number] Your Appointments 1/8/2018 10:00 AM  
Follow Up with Isidro Bautista MD  
[phone]) Appt Note: 6 month f/u appt [address] [facility] [address] [phone]  
  
   
 [address] Upcoming Health Maintenance Date Due INFLUENZA AGE 9 TO ADULT 8/1/2017 MEDICARE YEARLY EXAM 7/8/2018 Pneumococcal 65+ Low/Medium Risk (2 of 2 - PPSV23) 7/17/2018 GLAUCOMA SCREENING Q2Y 8/1/2018 COLON CANCER SCRN (BARIUM / CT COLO / FLX SIG) Q5 4/2/2019 DTaP/Tdap/Td series (2 - Td) 5/23/2027 Allergies as of 10/3/2017  Review Complete On: 10/3/2017 By: Rae Roe NP Severity Noted Reaction Type Reactions Biaxin [Clarithromycin] Medium 05/23/2017    Unknown (comments) \"spacey\" feeling Oxycodone-acetaminophen  05/29/2013    Itching Sulfa (Sulfonamide Antibiotics) Low 05/23/2017    Other (comments) Headaches Current Immunizations  Never Reviewed Name Date Pneumococcal Conjugate (PCV-13) 7/17/2017 Pneumococcal Polysaccharide (PPSV-23) 7/11/2013 Zoster Vaccine, Live 8/23/2012 Not reviewed this visit You Were Diagnosed With   
  
 Codes Comments Urinary tract infection with hematuria, site unspecified    -  Primary ICD-10-CM: N39.0, R31.9 ICD-9-CM: 599.0 Vitals BP Pulse Temp Resp Height(growth percentile) Weight(growth percentile) 114/78 (BP 1 Location: Left arm, BP Patient Position: Sitting) 76 98.4 °F (36.9 °C) (Oral) 20 5' 2\" (1.575 m) 191 lb (86.6 kg) SpO2 BMI OB Status Smoking Status 98% 34.93 kg/m2 Postmenopausal Never Smoker BMI and BSA Data Body Mass Index Body Surface Area  34.93 kg/m 2 1.95 m 2  
  
  
 Preferred Pharmacy Pharmacy Name Phone [facility], [place] - [address] [phone] Your Updated Medication List  
  
   
This list is accurate as of: 10/3/17 10:25 AM.  Always use your most recent med list.  
  
  
  
  
 albuterol 90 mcg/actuation inhaler Commonly known as:  PROVENTIL HFA, VENTOLIN HFA, PROAIR HFA Take 2 Puffs by inhalation every four (4) hours as needed. ALPRAZolam 0.5 mg tablet Commonly known as:  [place] Take 1 Tab by mouth daily. Max Daily Amount: 0.5 mg.  
  
 aspirin 81 mg chewable tablet Take 81 mg by mouth daily. atorvastatin 40 mg tablet Commonly known as:  LIPITOR Take 1 Tab by mouth daily. ciprofloxacin HCl 500 mg tablet Commonly known as:  CIPRO Take 1 Tab by mouth two (2) times a day for 3 days. dilTIAZem  mg ER capsule Commonly known as:  CARDIZEM CD Take 1 Cap by mouth daily. Wfgqzzxm-Bnlj-Hzzwqx-Hyalur Ac 179-930-53-2 mg Cap Take 1 Tab by mouth daily. montelukast 10 mg tablet Commonly known as:  SINGULAIR Take 10 mg by mouth daily. MULTIVITAMIN & MINERAL FORMULA PO Take 1 Tab by mouth daily. valsartan-hydroCHLOROthiazide 320-25 mg per tablet Commonly known as:  DIOVAN-HCT Take 1 Tab by mouth daily. Prescriptions Sent to Pharmacy Refills  
 ciprofloxacin HCl (CIPRO) 500 mg tablet 0 Sig: Take 1 Tab by mouth two (2) times a day for 3 days. Class: Normal  
 Pharmacy: [address] [place] [address], [address] [place] [address] Ph #: [phone] Route: Oral  
  
We Performed the Following AMB POC URINALYSIS DIP STICK AUTO W/O MICRO [21238 CPT(R)] Patient Instructions Urinary Tract Infection in Women: Care Instructions Your Care Instructions A urinary tract infection, or UTI, is a general term for an infection anywhere between the kidneys and the urethra (where urine comes out). Most UTIs are bladder infections. They often cause pain or burning when you urinate. UTIs are caused by bacteria and can be cured with antibiotics. Be sure to complete your treatment so that the infection goes away. Follow-up care is a key part of your treatment and safety. Be sure to make and go to all appointments, and call your doctor if you are having problems. It's also a good idea to know your test results and keep a list of the medicines you take. How can you care for yourself at home? · Take your antibiotics as directed. Do not stop taking them just because you feel better. You need to take the full course of antibiotics. · Drink extra water and other fluids for the next day or two. This may help wash out the bacteria that are causing the infection. (If you have kidney, heart, or liver disease and have to limit fluids, talk with your doctor before you increase your fluid intake.) · Avoid drinks that are carbonated or have caffeine. They can irritate the bladder. · Urinate often. Try to empty your bladder each time. · To relieve pain, take a hot bath or lay a heating pad set on low over your lower belly or genital area. Never go to sleep with a heating pad in place. To prevent UTIs · Drink plenty of water each day. This helps you urinate often, which clears bacteria from your system. (If you have kidney, heart, or liver disease and have to limit fluids, talk with your doctor before you increase your fluid intake.) · Urinate when you need to. · Urinate right after you have sex. · Change sanitary pads often. · Avoid douches, bubble baths, feminine hygiene sprays, and other feminine hygiene products that have deodorants. · After going to the bathroom, wipe from front to back. When should you call for help? Call your doctor now or seek immediate medical care if: · Symptoms such as fever, chills, nausea, or vomiting get worse or appear for the first time. · You have new pain in your back just below your rib cage. This is called flank pain. · There is new blood or pus in your urine. · You have any problems with your antibiotic medicine. Watch closely for changes in your health, and be sure to contact your doctor if: 
· You are not getting better after taking an antibiotic for 2 days. · Your symptoms go away but then come back. Where can you learn more? Go to http://maria e-yosef.info/. Enter X664 in the search box to learn more about \"Urinary Tract Infection in Women: Care Instructions. \" Current as of: November 28, 2016 Content Version: 11.3 © 0616-3772 [employer]. Care instructions adapted under license by [employer] (which disclaims liability or warranty for this information). If you have questions about a medical condition or this instruction, always ask your healthcare professional. [place] [address] any warranty or liability for your use of this information. Introducing [facility]! Dear Enrico Reece: Thank you for requesting a IXI-Play account. Our records indicate that you already have an active IXI-Play account. You can access your account anytime at [email or web address] Did you know that you can access your hospital and ER discharge instructions at any time in IXI-Play? You can also review all of your test results from your hospital stay or ER visit. Additional Information If you have questions, please visit the Frequently Asked Questions section of the IXI-Play website at [email or web address] Remember, IXI-Play is NOT to be used for urgent needs. For medical emergencies, dial 911. Now available from your iPhone and Android! Please provide this summary of care documentation to your next provider. Your primary care clinician is listed as Arden Salinas. If you have any questions after today's visit, please call [phone].

## 2017-10-03 NOTE — PROGRESS NOTES
Juan C Burt is a 79 y.o. female here for urinary frequency       1. Have you been to the ER, urgent care clinic or hospitalized since your last visit? NO.     2. Have you seen or consulted any other health care providers outside of the [address] [place] [address] since your last visit (Include any pap smears or colon screening)? NO      Do you have an Advanced Directive? NO    Would you like information on Advanced Directives?  NO

## 2017-10-03 NOTE — TELEPHONE ENCOUNTER
PT called as she noticed it looked like her Cipro was sent to [facility] not the [facility]. Please correct.

## 2017-10-03 NOTE — TELEPHONE ENCOUNTER
Called and canceled order that was sent to [facility] as well as called medication into requested local pharmacy.

## 2017-10-03 NOTE — PROGRESS NOTES
Subjective:      Patient : This patient is a 79 y.o. female that presents today with a chief complaint of (UTI/dysuria). The patient admits to accompanying frequency The patient has no previous UTI complaints. Objective:     Visit Vitals    /78 (BP 1 Location: Left arm, BP Patient Position: Sitting)    Pulse 76    Temp 98.4 °F (36.9 °C) (Oral)    Resp 20    Ht 5' 2\" (1.575 m)    Wt 191 lb (86.6 kg)    SpO2 98%    BMI 34.93 kg/m2      Abdomen to include suprapubic tenderness or lack thereof. Inguinal adenopathy present tender or absent. CVA tenderness or absence. Pelvic exam presence or absence of cervical motion tenderness (CMT). Skin:  warm and normal turgor  HEENT:  AGUILA, EOMI , TMI:  and Neck  Heart RRR, no mrg's   Lungs: clear to auscultation and percussion throughout both lung fields    Abdomen  normal, soft, non-tender and no guarding   Extremeties:no clubbing, cyanosis, edema and full ROM  Neuro alert, oriented x 3 and no focal deficits      Past Medical History:   Diagnosis Date    Arthritis     Asthma     Cancer ([facility] [address] 2013    breast cancer     Hypercholesterolemia     Hypertension      Family History   Problem Relation Age of Onset    Hypertension Mother     Kidney Disease Mother     Cancer Father     Heart Disease Brother      Current Outpatient Prescriptions   Medication Sig Dispense Refill    ciprofloxacin HCl (CIPRO) 500 mg tablet Take 1 Tab by mouth two (2) times a day for 3 days. 6 Tab 0    dilTIAZem CD (CARDIZEM CD) 240 mg ER capsule Take 1 Cap by mouth daily. 90 Cap 1    valsartan-hydroCHLOROthiazide (DIOVAN-HCT) 320-25 mg per tablet Take 1 Tab by mouth daily. 90 Tab 1    atorvastatin (LIPITOR) 40 mg tablet Take 1 Tab by mouth daily. 90 Tab 3    albuterol (PROVENTIL HFA, VENTOLIN HFA, PROAIR HFA) 90 mcg/actuation inhaler Take 2 Puffs by inhalation every four (4) hours as needed.  aspirin 81 mg chewable tablet Take 81 mg by mouth daily.       Ucirhlut-Cund-Ejabtc-Hyalur Ac 986-651-66-2 mg cap Take 1 Tab by mouth daily.  MULTIVITAMIN WITH MINERALS (MULTIVITAMIN & MINERAL FORMULA PO) Take 1 Tab by mouth daily.  ALPRAZolam (XANAX) 0.5 mg tablet Take 1 Tab by mouth daily. Max Daily Amount: 0.5 mg. 30 Tab 0    montelukast (SINGULAIR) 10 mg tablet Take 10 mg by mouth daily. Allergies   Allergen Reactions    Biaxin [Clarithromycin] Unknown (comments)     \"spacey\" feeling      Oxycodone-Acetaminophen Itching    Sulfa (Sulfonamide Antibiotics) Other (comments)     Headaches      Social History     Social History    Marital status:      Spouse name: N/A    Number of children: N/A    Years of education: N/A     Occupational History    Not on file. Social History Main Topics    Smoking status: Never Smoker    Smokeless tobacco: Never Used    Alcohol use Yes      Comment: social     Drug use: No    Sexual activity: Not Currently     Partners: Male     Other Topics Concern    Not on file     Social History Narrative           Assessment:   UTI      Plan:   able to give us a mid-stream clean catch urine specimen and the results of the (dip/UA) is consistent with a UTI. Will treat empirically with cipro. I have explained to the patient that UTIs usually resolve with this therapy so will not do a C&S unless therapy fails.  She is to contact the office in 3 days  A urine HCG was not done,      ciprofloxacin/CIPRO 500 mg BID for 3 days   Azo OTC TID after meals for 2 days and patient was warned that urine will turn orange

## 2017-10-03 NOTE — PATIENT INSTRUCTIONS
Urinary Tract Infection in Women: Care Instructions  Your Care Instructions    A urinary tract infection, or UTI, is a general term for an infection anywhere between the kidneys and the urethra (where urine comes out). Most UTIs are bladder infections. They often cause pain or burning when you urinate. UTIs are caused by bacteria and can be cured with antibiotics. Be sure to complete your treatment so that the infection goes away. Follow-up care is a key part of your treatment and safety. Be sure to make and go to all appointments, and call your doctor if you are having problems. It's also a good idea to know your test results and keep a list of the medicines you take. How can you care for yourself at home? · Take your antibiotics as directed. Do not stop taking them just because you feel better. You need to take the full course of antibiotics. · Drink extra water and other fluids for the next day or two. This may help wash out the bacteria that are causing the infection. (If you have kidney, heart, or liver disease and have to limit fluids, talk with your doctor before you increase your fluid intake.)  · Avoid drinks that are carbonated or have caffeine. They can irritate the bladder. · Urinate often. Try to empty your bladder each time. · To relieve pain, take a hot bath or lay a heating pad set on low over your lower belly or genital area. Never go to sleep with a heating pad in place. To prevent UTIs  · Drink plenty of water each day. This helps you urinate often, which clears bacteria from your system. (If you have kidney, heart, or liver disease and have to limit fluids, talk with your doctor before you increase your fluid intake.)  · Urinate when you need to. · Urinate right after you have sex. · Change sanitary pads often. · Avoid douches, bubble baths, feminine hygiene sprays, and other feminine hygiene products that have deodorants.   · After going to the bathroom, wipe from front to back.  When should you call for help? Call your doctor now or seek immediate medical care if:  · Symptoms such as fever, chills, nausea, or vomiting get worse or appear for the first time. · You have new pain in your back just below your rib cage. This is called flank pain. · There is new blood or pus in your urine. · You have any problems with your antibiotic medicine. Watch closely for changes in your health, and be sure to contact your doctor if:  · You are not getting better after taking an antibiotic for 2 days. · Your symptoms go away but then come back. Where can you learn more? Go to http://maria e-yosef.info/. Enter Q960 in the search box to learn more about \"Urinary Tract Infection in Women: Care Instructions. \"  Current as of: November 28, 2016  Content Version: 11.3  © 9828-0166 [employer]. Care instructions adapted under license by "[employer]" (which disclaims liability or warranty for this information). If you have questions about a medical condition or this instruction, always ask your healthcare professional. [address] any warranty or liability for your use of this information.

## 2017-10-06 LAB
BACTERIA SPEC CULT: ABNORMAL
BACTERIA SPEC CULT: ABNORMAL
SERVICE CMNT-IMP: ABNORMAL

## 2017-10-31 DIAGNOSIS — E78.2 MIXED HYPERCHOLESTEROLEMIA AND HYPERTRIGLYCERIDEMIA: ICD-10-CM

## 2017-10-31 RX ORDER — ATORVASTATIN CALCIUM 40 MG/1
40 TABLET, FILM COATED ORAL DAILY
Qty: 90 TAB | Refills: 3 | Status: SHIPPED | OUTPATIENT
Start: 2017-10-31 | End: 2018-07-17 | Stop reason: SDUPTHER

## 2017-12-11 ENCOUNTER — OFFICE VISIT (OUTPATIENT)
Dept: FAMILY MEDICINE CLINIC | Age: 67
End: 2017-12-11

## 2017-12-11 VITALS
HEART RATE: 104 BPM | SYSTOLIC BLOOD PRESSURE: 130 MMHG | OXYGEN SATURATION: 96 % | HEIGHT: 62 IN | TEMPERATURE: 98.5 F | RESPIRATION RATE: 20 BRPM | DIASTOLIC BLOOD PRESSURE: 58 MMHG | BODY MASS INDEX: 34.16 KG/M2 | WEIGHT: 185.6 LBS

## 2017-12-11 DIAGNOSIS — J18.9 COMMUNITY ACQUIRED PNEUMONIA, UNSPECIFIED LATERALITY: Primary | ICD-10-CM

## 2017-12-11 DIAGNOSIS — J45.41 MODERATE PERSISTENT ASTHMA WITH ACUTE EXACERBATION: ICD-10-CM

## 2017-12-11 RX ORDER — ALBUTEROL SULFATE 90 UG/1
2 AEROSOL, METERED RESPIRATORY (INHALATION)
Qty: 1 INHALER | Refills: 1 | Status: SHIPPED | OUTPATIENT
Start: 2017-12-11

## 2017-12-11 RX ORDER — CODEINE PHOSPHATE AND GUAIFENESIN 10; 100 MG/5ML; MG/5ML
5 SOLUTION ORAL
Qty: 180 ML | Refills: 0 | Status: SHIPPED | OUTPATIENT
Start: 2017-12-11 | End: 2018-01-22 | Stop reason: ALTCHOICE

## 2017-12-11 RX ORDER — PREDNISONE 10 MG/1
TABLET ORAL
Qty: 21 TAB | Refills: 0 | Status: SHIPPED | OUTPATIENT
Start: 2017-12-11 | End: 2018-01-22 | Stop reason: ALTCHOICE

## 2017-12-11 NOTE — PROGRESS NOTES
Assessment/Plan:    1. Community acquired pneumonia, unspecified laterality- s/p doxy. Ck cxr to ensure clearance given continued cough, fatigue. If neg, cont albuterol and prednisone. Discussed cough can last several weeks after treatment. - XR CHEST PA LAT; Future    2. Moderate persistent asthma with acute exacerbation  - predniSONE (STERAPRED DS) 10 mg dose pack; See administration instruction per 10mg dose pack  Dispense: 21 Tab; Refill: 0  - albuterol (PROVENTIL HFA, VENTOLIN HFA, PROAIR HFA) 90 mcg/actuation inhaler; Take 2 Puffs by inhalation every four (4) hours as needed. Dispense: 1 Inhaler; Refill: 1    The plan was discussed with the patient. The patient verbalized understanding and is in agreement with the plan. All medication potential side effects were discussed with the patient. Health Maintenance:   Health Maintenance   Topic Date Due    Influenza Age 5 to Adult  08/01/2017    MEDICARE YEARLY EXAM  07/08/2018    Pneumococcal 65+ Low/Medium Risk (2 of 2 - PPSV23) 07/17/2018    GLAUCOMA SCREENING Q2Y  08/01/2018    COLON CANCER SCRN (BARIUM / CT COLO / FLX SIG) Q5  04/02/2019    DTaP/Tdap/Td series (2 - Td) 05/23/2027    Hepatitis C Screening  Completed    OSTEOPOROSIS SCREENING (DEXA)  Completed    ZOSTER VACCINE AGE 60>  Completed       Juan C Burt is a 79 y.o. female and presents with ED [facility] ([facility])     Subjective:  Pt recently seen for [facility] for [facility], 11/30. She continues to have productive cough, wheezing, fatigue. Treated with doxycycline and prednisone, finished 12/7. No f/c. Had breathing treatment at [facility], and she felt that really helped. ROS:  Constitutional: No recent weight change. No weakness/+fatigue. No f/c. HENT: No HA, dizziness. No hearing loss/tinnitus. No nasal congestion/discharge. Eyes: No change in vision, double/blurred vision or eye pain/redness. Cardiovascular: No CP/palpitations.   No STAHL/orthopnea/PND. Respiratory: + cough/sputum, dyspnea, +wheezing. The problem list was updated as a part of today's visit. Patient Active Problem List   Diagnosis Code    History of breast cancer Z85.3    Mixed hypercholesterolemia and hypertriglyceridemia E78.2    Elevated blood sugar R73.9    Essential hypertension I10    Sinus tachycardia R00.0    S/P bilateral mastectomy Z90.13    Vitamin D deficiency E55.9       The PSH, FH were reviewed. SH:  Social History   Substance Use Topics    Smoking status: Never Smoker    Smokeless tobacco: Never Used    Alcohol use Yes      Comment: social        Medications/Allergies:  Current Outpatient Prescriptions on File Prior to Visit   Medication Sig Dispense Refill    atorvastatin (LIPITOR) 40 mg tablet Take 1 Tab by mouth daily. 90 Tab 3    montelukast (SINGULAIR) 10 mg tablet Take 10 mg by mouth daily.  dilTIAZem CD (CARDIZEM CD) 240 mg ER capsule Take 1 Cap by mouth daily. 90 Cap 1    valsartan-hydroCHLOROthiazide (DIOVAN-HCT) 320-25 mg per tablet Take 1 Tab by mouth daily. 90 Tab 1    albuterol (PROVENTIL HFA, VENTOLIN HFA, PROAIR HFA) 90 mcg/actuation inhaler Take 2 Puffs by inhalation every four (4) hours as needed.  aspirin 81 mg chewable tablet Take 81 mg by mouth daily.  Wovigdpp-Byhz-Ajumzk-Hyalur Ac 992-861-86-2 mg cap Take 1 Tab by mouth daily.  MULTIVITAMIN WITH MINERALS (MULTIVITAMIN & MINERAL FORMULA PO) Take 1 Tab by mouth daily.  ALPRAZolam (XANAX) 0.5 mg tablet Take 1 Tab by mouth daily. Max Daily Amount: 0.5 mg. 30 Tab 0     No current facility-administered medications on file prior to visit.          Allergies   Allergen Reactions    Biaxin [Clarithromycin] Unknown (comments)     \"spacey\" feeling      Oxycodone-Acetaminophen Itching    Sulfa (Sulfonamide Antibiotics) Other (comments)     Headaches        Objective:  Visit Vitals    /58 (BP 1 Location: Left arm, BP Patient Position: Sitting)    Pulse (!) 104    Temp 98.5 °F (36.9 °C) (Oral)    Resp 20    Ht 5' 2\" (1.575 m)    Wt 185 lb 9.6 oz (84.2 kg)    SpO2 96%    BMI 33.95 kg/m2      Constitutional: Well developed, nourished, no distress, alert, obese habitus, nontoxic   HENT: Exterior ears and tympanic membranes normal bilaterally. Supple neck. No thyromegaly or lymphadenopathy. Oropharynx clear and moist mucous membranes. Eyes: Conjunctiva normal. PERRL. CV: S1, S2.  Reg, tachy. No murmurs/rubs. No thrills palpated. No carotid bruits. Intact distal pulses. No edema. Pulm: No abnormalities on inspection. Wheezing bilat upper lobes. Normal effort. Labwork and Ancillary Studies:    CBC w/Diff  Lab Results   Component Value Date/Time    WBC 12.1 07/21/2017 08:32 AM    HGB 12.0 07/21/2017 08:32 AM    PLATELET 203 35/75/9177 08:32 AM         Basic Metabolic Profile/LFTs  Lab Results   Component Value Date/Time    Sodium 137 07/21/2017 08:32 AM    Potassium 4.0 07/21/2017 08:32 AM    Chloride 95 07/21/2017 08:32 AM    CO2 32 07/21/2017 08:32 AM    Anion gap 10 07/21/2017 08:32 AM    Glucose 109 07/21/2017 08:32 AM    BUN 15 07/21/2017 08:32 AM    Creatinine 0.79 07/21/2017 08:32 AM    BUN/Creatinine ratio 19 07/21/2017 08:32 AM    GFR est AA >60 07/21/2017 08:32 AM    GFR est non-AA >60 07/21/2017 08:32 AM    Calcium 9.5 07/21/2017 08:32 AM      Lab Results   Component Value Date/Time    ALT (SGPT) 25 07/21/2017 08:32 AM    AST (SGOT) 11 07/21/2017 08:32 AM    Alk.  phosphatase 108 07/21/2017 08:32 AM    Bilirubin, total 0.4 07/21/2017 08:32 AM       Cholesterol  Lab Results   Component Value Date/Time    Cholesterol, total 218 07/21/2017 08:32 AM    HDL Cholesterol 97 07/21/2017 08:32 AM    LDL, calculated 100.2 07/21/2017 08:32 AM    Triglyceride 104 07/21/2017 08:32 AM    CHOL/HDL Ratio 2.2 07/21/2017 08:32 AM

## 2017-12-11 NOTE — MR AVS SNAPSHOT
[facility] 
 
 
 [address] [facility] [id number] [phone] Patient: Celestina Mujica MRN: [id number] Visit Information Date & Time Provider Department Dept. Phone Encounter #  
 12/11/2017 11:00 AM Asiya Atkins, 3 [facility] [phone] [id number] Your Appointments 1/8/2018 10:00 AM  
Follow Up with Asiya Atkins MD  
3 [facility] Appt Note: 6 month f/u appt [address] [facility] [id number] [phone]  
  
   
 [address] Upcoming Health Maintenance Date Due  
 MEDICARE YEARLY EXAM 7/8/2018 Pneumococcal 65+ Low/Medium Risk (2 of 2 - PPSV23) 7/17/2018 GLAUCOMA SCREENING Q2Y 8/1/2018 COLON CANCER SCRN (BARIUM / CT COLO / FLX SIG) Q5 4/2/2019 DTaP/Tdap/Td series (2 - Td) 5/23/2027 Allergies as of 12/11/2017  Review Complete On: 12/11/2017 By: Chance Olguin Severity Noted Reaction Type Reactions Biaxin [Clarithromycin] Medium 05/23/2017    Unknown (comments) \"spacey\" feeling Oxycodone-acetaminophen  05/29/2013    Itching Sulfa (Sulfonamide Antibiotics) Low 05/23/2017    Other (comments) Headaches Current Immunizations  Never Reviewed Name Date Pneumococcal Conjugate (PCV-13) 7/17/2017 Pneumococcal Polysaccharide (PPSV-23) 7/11/2013 Zoster Vaccine, Live 8/23/2012 Not reviewed this visit You Were Diagnosed With   
  
 Codes Comments Community acquired pneumonia, unspecified laterality    -  Primary ICD-10-CM: J18.9 ICD-9-CM: 835 Moderate persistent asthma with acute exacerbation     ICD-10-CM: J45.41 
ICD-9-CM: 493.92 Vitals BP Pulse Temp Resp Height(growth percentile) Weight(growth percentile) 130/58 (BP 1 Location: Left arm, BP Patient Position: Sitting) (!) 104 98.5 °F (36.9 °C) (Oral) 20 5' 2\" (1.575 m) 185 lb 9.6 oz (84.2 kg) SpO2 BMI OB Status Smoking Status 96% 33.95 kg/m2 Postmenopausal Never Smoker Vitals History BMI and BSA Data Body Mass Index Body Surface Area 33.95 kg/m 2 1.92 m 2 Preferred Pharmacy Pharmacy Name Phone [facility] [place] [phone] Your Updated Medication List  
  
   
This list is accurate as of: 12/11/17 11:30 AM.  Always use your most recent med list.  
  
  
  
  
 albuterol 90 mcg/actuation inhaler Commonly known as:  PROVENTIL HFA, VENTOLIN HFA, PROAIR HFA Take 2 Puffs by inhalation every four (4) hours as needed. ALPRAZolam 0.5 mg tablet Commonly known as:  Shelby Ro Take 1 Tab by mouth daily. Max Daily Amount: 0.5 mg.  
  
 aspirin 81 mg chewable tablet Take 81 mg by mouth daily. atorvastatin 40 mg tablet Commonly known as:  LIPITOR Take 1 Tab by mouth daily. dilTIAZem  mg ER capsule Commonly known as:  CARDIZEM CD Take 1 Cap by mouth daily. Zgxdhspn-Llgh-Fxcfao-Hyalur Ac 553-141-35-2 mg Cap Take 1 Tab by mouth daily. guaiFENesin-codeine 100-10 mg/5 mL solution Commonly known as:  ROBITUSSIN AC Take 5 mL by mouth nightly as needed for Cough (do not combine with xanax). Max Daily Amount: 5 mL. montelukast 10 mg tablet Commonly known as:  SINGULAIR Take 10 mg by mouth daily. MULTIVITAMIN & MINERAL FORMULA PO Take 1 Tab by mouth daily. predniSONE 10 mg dose pack Commonly known as:  STERAPRED DS See administration instruction per 10mg dose pack  
  
 valsartan-hydroCHLOROthiazide 320-25 mg per tablet Commonly known as:  DIOVAN-HCT Take 1 Tab by mouth daily. Prescriptions Printed  Refills  
 guaiFENesin-codeine (ROBITUSSIN AC) 100-10 mg/5 mL solution 0  
 Sig: Take 5 mL by mouth nightly as needed for Cough (do not combine with xanax). Max Daily Amount: 5 mL. Class: Print Route: Oral  
  
Prescriptions Sent to Pharmacy Refills  
 predniSONE (STERAPRED DS) 10 mg dose pack 0 Sig: See administration instruction per 10mg dose pack Class: Normal  
 Pharmacy: [address] [facility], [address] Benita Resendiz Ph #: [phone]  
 albuterol (PROVENTIL HFA, VENTOLIN HFA, PROAIR HFA) 90 mcg/actuation inhaler 1 Sig: Take 2 Puffs by inhalation every four (4) hours as needed. Class: Normal  
 Pharmacy: [address] [facility], [address] [address] Ph #: [phone] Route: Inhalation To-Do List   
 12/11/2017 Imaging:  XR CHEST PA LAT Introducing [facility] SERVICES! Dear Derrick Pérez: Thank you for requesting a [employer] account. Our records indicate that you already have an active [employer] account. You can access your account anytime at [email or web address] Did you know that you can access your hospital and ER discharge instructions at any time in [employer]? You can also review all of your test results from your hospital stay or ER visit. Additional Information If you have questions, please visit the Frequently Asked Questions section of the [employer] website at [email or web address] Remember, [employer] is NOT to be used for urgent needs. For medical emergencies, dial 911. Now available from your iPhone and Android! Please provide this summary of care documentation to your next provider. Your primary care clinician is listed as Arden Salinas. If you have any questions after today's visit, please call [phone].

## 2017-12-11 NOTE — PROGRESS NOTES
Sunday Lorenz is a 79 y.o. female (: 1950) presenting to address:    Chief Complaint   Patient presents with   [address] [place] [address] ED Follow-up     Patient first       Vitals:    17 1055   BP: 130/58   Pulse: (!) 104   Resp: 20   Temp: 98.5 °F (36.9 °C)   TempSrc: Oral   SpO2: 96%   Weight: 185 lb 9.6 oz (84.2 kg)   Height: 5' 2\" (1.575 m)   PainSc:   3   PainLoc: Chest       Hearing/Vision:   No exam data present    Learning Assessment:     Learning Assessment 2017   PRIMARY LEARNER Patient   HIGHEST LEVEL OF EDUCATION - PRIMARY LEARNER  4 YEARS OF COLLEGE   BARRIERS PRIMARY LEARNER NONE   CO-LEARNER CAREGIVER No   PRIMARY LANGUAGE ENGLISH    NEED No   LEARNER PREFERENCE PRIMARY READING   ANSWERED BY self   RELATIONSHIP SELF     Depression Screening:     PHQ over the last two weeks 10/3/2017   Little interest or pleasure in doing things Not at all   Feeling down, depressed or hopeless Not at all   Total Score PHQ 2 0     Fall Risk Assessment:     Fall Risk Assessment, last 12 mths 2017   Able to walk? Yes   Fall in past 12 months? No     Abuse Screening:     Abuse Screening Questionnaire 2017   Do you ever feel afraid of your partner? N   Are you in a relationship with someone who physically or mentally threatens you? N   Is it safe for you to go home? Y     Coordination of Care Questionaire:   1. Have you been to the ER, urgent care clinic since your last visit? Hospitalized since your last visit? YES Patient [facility] 17    2. Have you seen or consulted any other health care providers outside of the [address] [place] [address] since your last visit? Include any pap smears or colon screening. YES Pulmonary 10/2017    Advanced Directive:   1. Do you have an Advanced Directive? YES    2. Would you like information on Advanced Directives?  NO

## 2018-01-16 DIAGNOSIS — I10 ESSENTIAL HYPERTENSION: ICD-10-CM

## 2018-01-17 RX ORDER — DILTIAZEM HYDROCHLORIDE 240 MG/1
CAPSULE, EXTENDED RELEASE ORAL
Qty: 90 CAP | Refills: 1 | Status: SHIPPED | OUTPATIENT
Start: 2018-01-17 | End: 2018-07-24 | Stop reason: SDUPTHER

## 2018-01-22 ENCOUNTER — OFFICE VISIT (OUTPATIENT)
Dept: FAMILY MEDICINE CLINIC | Age: 68
End: 2018-01-22

## 2018-01-22 VITALS
WEIGHT: 189 LBS | BODY MASS INDEX: 34.78 KG/M2 | HEART RATE: 105 BPM | OXYGEN SATURATION: 98 % | SYSTOLIC BLOOD PRESSURE: 130 MMHG | DIASTOLIC BLOOD PRESSURE: 80 MMHG | TEMPERATURE: 98.1 F | HEIGHT: 62 IN | RESPIRATION RATE: 20 BRPM

## 2018-01-22 DIAGNOSIS — I10 ESSENTIAL HYPERTENSION: Primary | ICD-10-CM

## 2018-01-22 DIAGNOSIS — J01.10 ACUTE NON-RECURRENT FRONTAL SINUSITIS: ICD-10-CM

## 2018-01-22 RX ORDER — AMOXICILLIN AND CLAVULANATE POTASSIUM 500; 125 MG/1; MG/1
1 TABLET, FILM COATED ORAL 2 TIMES DAILY
Qty: 14 TAB | Refills: 0 | Status: SHIPPED | OUTPATIENT
Start: 2018-01-22 | End: 2018-01-29

## 2018-01-22 RX ORDER — VALSARTAN AND HYDROCHLOROTHIAZIDE 320; 25 MG/1; MG/1
1 TABLET, FILM COATED ORAL DAILY
Qty: 90 TAB | Refills: 1 | Status: SHIPPED | OUTPATIENT
Start: 2018-01-22 | End: 2018-07-25 | Stop reason: ALTCHOICE

## 2018-01-22 NOTE — PROGRESS NOTES
Assessment/Plan:    1. Essential hypertension  -refilled  - valsartan-hydroCHLOROthiazide (DIOVAN-HCT) 320-25 mg per tablet; Take 1 Tab by mouth daily. Dispense: 90 Tab; Refill: 1    2. Acute non-recurrent frontal sinusitis  - amoxicillin-clavulanate (AUGMENTIN) 500-125 mg per tablet; Take 1 Tab by mouth two (2) times a day for 7 days. Dispense: 14 Tab; Refill: 0    The plan was discussed with the patient. The patient verbalized understanding and is in agreement with the plan. All medication potential side effects were discussed with the patient. Health Maintenance:   Health Maintenance   Topic Date Due    MEDICARE YEARLY EXAM  07/08/2018    Pneumococcal 65+ Low/Medium Risk (2 of 2 - PPSV23) 07/17/2018    GLAUCOMA SCREENING Q2Y  08/01/2018    COLON CANCER SCRN (BARIUM / CT COLO / FLX SIG) Q5  04/02/2019    DTaP/Tdap/Td series (2 - Td) 05/23/2027    Hepatitis C Screening  Completed    OSTEOPOROSIS SCREENING (DEXA)  Completed    ZOSTER VACCINE AGE 60>  Completed    Influenza Age 5 to Adult  Completed       Kannanpatel Asif is a 79 y.o. female and presents with Hypertension and Cholesterol Problem     Subjective:  HTN- on cardizem. bp controlled. Pt recently treated by oncologist with zpack 12/29 for cough. But now having pain over bilat frontal region. No f/c.  +having some eye sensitivity. No nasal congestion. ROS:  Constitutional: No recent weight change. No weakness/fatigue. No f/c. HENT: No HA, dizziness. No hearing loss/tinnitus. No nasal congestion/discharge. Cardiovascular: No CP/palpitations. No STAHL/orthopnea/PND. Respiratory: No cough/sputum, dyspnea, wheezing. Gastointestinal: No dysphagia, reflux. No n/v.  +constipation/no diarrhea. No melena/rectal bleeding. Genitourinary: No dysuria, urinary hesitancy, nocturia, hematuria. No incontinence. The problem list was updated as a part of today's visit.   Patient Active Problem List   Diagnosis Code    History of breast cancer Z85.3    Mixed hypercholesterolemia and hypertriglyceridemia E78.2    Elevated blood sugar R73.9    Essential hypertension I10    Sinus tachycardia R00.0    S/P bilateral mastectomy Z90.13    Vitamin D deficiency E55.9       The PSH, FH were reviewed. SH:  Social History   Substance Use Topics    Smoking status: Never Smoker    Smokeless tobacco: Never Used    Alcohol use Yes      Comment: social        Medications/Allergies:  Current Outpatient Prescriptions on File Prior to Visit   Medication Sig Dispense Refill    CARTIA  mg ER capsule TAKE 1 CAPSULE EVERY DAY 90 Cap 1    albuterol (PROVENTIL HFA, VENTOLIN HFA, PROAIR HFA) 90 mcg/actuation inhaler Take 2 Puffs by inhalation every four (4) hours as needed. 1 Inhaler 1    atorvastatin (LIPITOR) 40 mg tablet Take 1 Tab by mouth daily. 90 Tab 3    montelukast (SINGULAIR) 10 mg tablet Take 10 mg by mouth daily.  aspirin 81 mg chewable tablet Take 81 mg by mouth daily.  Nhcxvedz-Bpgp-Oosqcf-Hyalur Ac 199-858-12-2 mg cap Take 1 Tab by mouth daily.  MULTIVITAMIN WITH MINERALS (MULTIVITAMIN & MINERAL FORMULA PO) Take 1 Tab by mouth daily.  ALPRAZolam (XANAX) 0.5 mg tablet Take 1 Tab by mouth daily. Max Daily Amount: 0.5 mg. 30 Tab 0     No current facility-administered medications on file prior to visit. Allergies   Allergen Reactions    Biaxin [Clarithromycin] Unknown (comments)     \"spacey\" feeling      Oxycodone-Acetaminophen Itching    Sulfa (Sulfonamide Antibiotics) Other (comments)     Headaches        Objective:  Visit Vitals    /80 (BP 1 Location: Right arm, BP Patient Position: Sitting)    Pulse (!) 105    Temp 98.1 °F (36.7 °C) (Oral)    Resp 20    Ht 5' 2\" (1.575 m)    Wt 189 lb (85.7 kg)    SpO2 98%    BMI 34.57 kg/m2      Constitutional: Well developed, nourished, no distress, alert, obese habitus   CV: S1, S2.  RRR. No murmurs/rubs. No thrills palpated. No carotid bruits. Intact distal pulses. No edema. Pulm: No abnormalities on inspection. Clear to auscultation bilaterally. No wheezing/rhonchi. Normal effort. Neuro: A/O x 3. No focal motor or sensory deficits. Speech normal.   Psych: Appropriate affect, judgement and insight. Short-term memory intact. ENT: tenderness over maxillary and frontal sinuses bilat. Labwork and Ancillary Studies:    CBC w/Diff  Lab Results   Component Value Date/Time    WBC 12.1 07/21/2017 08:32 AM    HGB 12.0 07/21/2017 08:32 AM    PLATELET 606 18/33/4895 08:32 AM         Basic Metabolic Profile/LFTs  Lab Results   Component Value Date/Time    Sodium 137 07/21/2017 08:32 AM    Potassium 4.0 07/21/2017 08:32 AM    Chloride 95 07/21/2017 08:32 AM    CO2 32 07/21/2017 08:32 AM    Anion gap 10 07/21/2017 08:32 AM    Glucose 109 07/21/2017 08:32 AM    BUN 15 07/21/2017 08:32 AM    Creatinine 0.79 07/21/2017 08:32 AM    BUN/Creatinine ratio 19 07/21/2017 08:32 AM    GFR est AA >60 07/21/2017 08:32 AM    GFR est non-AA >60 07/21/2017 08:32 AM    Calcium 9.5 07/21/2017 08:32 AM      Lab Results   Component Value Date/Time    ALT (SGPT) 25 07/21/2017 08:32 AM    AST (SGOT) 11 07/21/2017 08:32 AM    Alk.  phosphatase 108 07/21/2017 08:32 AM    Bilirubin, total 0.4 07/21/2017 08:32 AM       Cholesterol  Lab Results   Component Value Date/Time    Cholesterol, total 218 07/21/2017 08:32 AM    HDL Cholesterol 97 07/21/2017 08:32 AM    LDL, calculated 100.2 07/21/2017 08:32 AM    Triglyceride 104 07/21/2017 08:32 AM    CHOL/HDL Ratio 2.2 07/21/2017 08:32 AM

## 2018-01-22 NOTE — MR AVS SNAPSHOT
Alma Deng 
 
 
 [address] [facility] [address] [phone] Patient: Mendel Velasquez MRN: [id number] LAZO:[birth year] Visit Information Date & Time Provider Department Dept. Phone Encounter #  
 1/22/2018  9:15 AM GIL Alcazar/[employer] [phone] [id number] Upcoming Health Maintenance Date Due  
 MEDICARE YEARLY EXAM 7/8/2018 Pneumococcal 65+ Low/Medium Risk (2 of 2 - PPSV23) 7/17/2018 GLAUCOMA SCREENING Q2Y 8/1/2018 COLON CANCER SCRN (BARIUM / CT COLO / FLX SIG) Q5 4/2/2019 DTaP/Tdap/Td series (2 - Td) 5/23/2027 Allergies as of 1/22/2018  Review Complete On: 1/22/2018 By: Sreekanth Cruz MD  
  
 Severity Noted Reaction Type Reactions Biaxin [Clarithromycin] Medium 05/23/2017    Unknown (comments) \"spacey\" feeling Oxycodone-acetaminophen  05/29/2013    Itching Sulfa (Sulfonamide Antibiotics) Low 05/23/2017    Other (comments) Headaches Current Immunizations  Never Reviewed Name Date Pneumococcal Conjugate (PCV-13) 7/17/2017 Pneumococcal Polysaccharide (PPSV-23) 7/11/2013 Zoster Vaccine, Live 8/23/2012 Not reviewed this visit You Were Diagnosed With   
  
 Codes Comments Essential hypertension    -  Primary ICD-10-CM: I10 
ICD-9-CM: 401.9 Acute non-recurrent frontal sinusitis     ICD-10-CM: J01.10 ICD-9-CM: 498.1 Vitals BP Pulse Temp Resp Height(growth percentile) Weight(growth percentile) 130/80 (BP 1 Location: Right arm, BP Patient Position: Sitting) (!) 105 98.1 °F (36.7 °C) (Oral) 20 5' 2\" (1.575 m) 189 lb (85.7 kg) SpO2 BMI OB Status Smoking Status 98% 34.57 kg/m2 Postmenopausal Never Smoker Vitals History BMI and BSA Data Body Mass Index Body Surface Area 34.57 kg/m 2 1.94 m 2 [facility] [address] Titi Escobar [phone] Your Updated Medication List  
  
   
This list is accurate as of: 1/22/18  9:34 AM.  Always use your most recent med list.  
  
  
  
  
 albuterol 90 mcg/actuation inhaler Commonly known as:  PROVENTIL HFA, VENTOLIN HFA, PROAIR HFA Take 2 Puffs by inhalation every four (4) hours as needed. ALPRAZolam 0.5 mg tablet Commonly known as:  Ryan Snuffer Take 1 Tab by mouth daily. Max Daily Amount: 0.5 mg.  
  
 amoxicillin-clavulanate 500-125 mg per tablet Commonly known as:  AUGMENTIN Take 1 Tab by mouth two (2) times a day for 7 days. aspirin 81 mg chewable tablet Take 81 mg by mouth daily. atorvastatin 40 mg tablet Commonly known as:  LIPITOR Take 1 Tab by mouth daily. CARTIA  mg ER capsule Generic drug:  dilTIAZem CD  
TAKE 1 CAPSULE EVERY DAY Gewhjfwd-Pjsm-Llmqyv-Hyalur Ac 700-734-88-2 mg Cap Take 1 Tab by mouth daily. montelukast 10 mg tablet Commonly known as:  SINGULAIR Take 10 mg by mouth daily. MULTIVITAMIN & MINERAL FORMULA PO Take 1 Tab by mouth daily. valsartan-hydroCHLOROthiazide 320-25 mg per tablet Commonly known as:  DIOVAN-HCT Take 1 Tab by mouth daily. Prescriptions Sent to Pharmacy Refills  
 valsartan-hydroCHLOROthiazide (DIOVAN-HCT) 320-25 mg per tablet 1 Sig: Take 1 Tab by mouth daily. Class: Normal  
 Pharmacy: [address] [facility], [address] Ph #: [phone] Route: Oral  
 amoxicillin-clavulanate (AUGMENTIN) 500-125 mg per tablet 0 Sig: Take 1 Tab by mouth two (2) times a day for 7 days. Class: Normal  
 Pharmacy: [address] [facility], [address] [facility] Ph #: [phone] Route: Oral  
  
Introducing [facility] & HEALTH SERVICES! Dear Shaka Taylor: Thank you for requesting a [employer] account. Our records indicate that you already have an active [employer] account. You can access your account anytime at [email or web address] Did you know that you can access your hospital and ER discharge instructions at any time in [employer]? You can also review all of your test results from your hospital stay or ER visit. Additional Information If you have questions, please visit the Frequently Asked Questions section of the [employer] website at [email or web address] Remember, [employer] is NOT to be used for urgent needs. For medical emergencies, dial 911. Now available from your iPhone and Android! Please provide this summary of care documentation to your next provider. Your primary care clinician is listed as Arden Salinas. If you have any questions after today's visit, please call [phone].

## 2018-01-22 NOTE — PROGRESS NOTES
Abigail Conti is a 79 y.o. female (: 1950) presenting to address:    Chief Complaint   Patient presents with    Hypertension    Cholesterol Problem       Vitals:    18 0915   BP: 130/80   Pulse: (!) 105   Resp: 20   Temp: 98.1 °F (36.7 °C)   TempSrc: Oral   SpO2: 98%   Weight: 189 lb (85.7 kg)   Height: 5' 2\" (1.575 m)   PainSc:   5   PainLoc: Head       Learning Assessment:     Learning Assessment 2017   PRIMARY LEARNER Patient   HIGHEST LEVEL OF EDUCATION - PRIMARY LEARNER  4 YEARS OF COLLEGE   BARRIERS PRIMARY LEARNER NONE   CO-LEARNER CAREGIVER No   PRIMARY LANGUAGE ENGLISH    NEED No   LEARNER PREFERENCE PRIMARY READING   ANSWERED BY self   RELATIONSHIP SELF     Depression Screening:     PHQ over the last two weeks 10/3/2017   Little interest or pleasure in doing things Not at all   Feeling down, depressed or hopeless Not at all   Total Score PHQ 2 0     Fall Risk Assessment:     Fall Risk Assessment, last 12 mths 2017   Able to walk? Yes   Fall in past 12 months? No     Abuse Screening:     Abuse Screening Questionnaire 2017   Do you ever feel afraid of your partner? N   Are you in a relationship with someone who physically or mentally threatens you? N   Is it safe for you to go home? Y     Coordination of Care Questionaire:   1. Have you been to the ER, urgent care clinic since your last visit? Hospitalized since your last visit? NO    2. Have you seen or consulted any other health care providers outside of the [address] [place] [address] since your last visit? Include any pap smears or colon screening. YES. Oncology 17    Advanced Directive:   1. Do you have an Advanced Directive? YES    2. Would you like information on Advanced Directives?  NO

## 2018-06-07 PROBLEM — I48.0 PAF (PAROXYSMAL ATRIAL FIBRILLATION) (HCC): Status: ACTIVE | Noted: 2018-06-07

## 2018-07-17 DIAGNOSIS — E78.2 MIXED HYPERCHOLESTEROLEMIA AND HYPERTRIGLYCERIDEMIA: ICD-10-CM

## 2018-07-17 RX ORDER — ATORVASTATIN CALCIUM 40 MG/1
TABLET, FILM COATED ORAL
Qty: 90 TAB | Refills: 3 | Status: SHIPPED | OUTPATIENT
Start: 2018-07-17

## 2018-07-24 ENCOUNTER — HOSPITAL ENCOUNTER (OUTPATIENT)
Dept: LAB | Age: 68
Discharge: HOME | End: 2018-07-24
Payer: MEDICARE

## 2018-07-24 ENCOUNTER — OFFICE VISIT (OUTPATIENT)
Dept: FAMILY MEDICINE CLINIC | Age: 68
End: 2018-07-24

## 2018-07-24 VITALS
OXYGEN SATURATION: 96 % | HEART RATE: 110 BPM | RESPIRATION RATE: 20 BRPM | HEIGHT: 62 IN | DIASTOLIC BLOOD PRESSURE: 68 MMHG | SYSTOLIC BLOOD PRESSURE: 110 MMHG | TEMPERATURE: 97.8 F | BODY MASS INDEX: 34.96 KG/M2 | WEIGHT: 190 LBS

## 2018-07-24 DIAGNOSIS — Z78.9 FULL CODE STATUS: ICD-10-CM

## 2018-07-24 DIAGNOSIS — E78.2 MIXED HYPERCHOLESTEROLEMIA AND HYPERTRIGLYCERIDEMIA: ICD-10-CM

## 2018-07-24 DIAGNOSIS — N76.0 ACUTE VAGINITIS: Primary | ICD-10-CM

## 2018-07-24 DIAGNOSIS — R73.9 ELEVATED BLOOD SUGAR: ICD-10-CM

## 2018-07-24 DIAGNOSIS — R82.998 LEUKOCYTES IN URINE: ICD-10-CM

## 2018-07-24 DIAGNOSIS — I10 ESSENTIAL HYPERTENSION: ICD-10-CM

## 2018-07-24 DIAGNOSIS — R35.1 NOCTURIA: ICD-10-CM

## 2018-07-24 DIAGNOSIS — I48.0 PAF (PAROXYSMAL ATRIAL FIBRILLATION) (HCC): ICD-10-CM

## 2018-07-24 DIAGNOSIS — Z00.00 MEDICARE ANNUAL WELLNESS VISIT, SUBSEQUENT: ICD-10-CM

## 2018-07-24 DIAGNOSIS — R00.0 SINUS TACHYCARDIA: ICD-10-CM

## 2018-07-24 DIAGNOSIS — Z23 ENCOUNTER FOR IMMUNIZATION: ICD-10-CM

## 2018-07-24 PROBLEM — Z66 DNR (DO NOT RESUSCITATE): Status: RESOLVED | Noted: 2018-07-24 | Resolved: 2018-07-24

## 2018-07-24 PROBLEM — Z66 DNR (DO NOT RESUSCITATE): Status: ACTIVE | Noted: 2018-07-24

## 2018-07-24 LAB
BILIRUB UR QL STRIP: NEGATIVE
GLUCOSE UR-MCNC: NEGATIVE MG/DL
KETONES P FAST UR STRIP-MCNC: NORMAL MG/DL
PH UR STRIP: 6 [PH] (ref 4.6–8)
PROT UR QL STRIP: NEGATIVE
SP GR UR STRIP: 1.01 (ref 1–1.03)
UA UROBILINOGEN AMB POC: NORMAL (ref 0.2–1)
URINALYSIS CLARITY POC: CLEAR
URINALYSIS COLOR POC: YELLOW
URINE BLOOD POC: NEGATIVE
URINE LEUKOCYTES POC: NORMAL
URINE NITRITES POC: NEGATIVE

## 2018-07-24 PROCEDURE — 87086 URINE CULTURE/COLONY COUNT: CPT | Performed by: INTERNAL MEDICINE

## 2018-07-24 RX ORDER — DILTIAZEM HYDROCHLORIDE 240 MG/1
CAPSULE, COATED, EXTENDED RELEASE ORAL
Qty: 90 CAP | Refills: 1 | Status: SHIPPED | OUTPATIENT
Start: 2018-07-24 | End: 2019-03-05 | Stop reason: SDUPTHER

## 2018-07-24 RX ORDER — FLUCONAZOLE 150 MG/1
150 TABLET ORAL DAILY
Qty: 1 TAB | Refills: 0 | Status: SHIPPED | OUTPATIENT
Start: 2018-07-24 | End: 2018-07-25

## 2018-07-24 NOTE — ACP (ADVANCE CARE PLANNING)
Advance Care Planning (ACP) Provider Conversation Snapshot    Date of ACP Conversation: 07/24/18  Persons included in Conversation:  patient  Length of ACP Conversation in minutes:  <16 minutes (Non-Billable)    Authorized Decision Maker (if patient is incapable of making informed decisions):    This person is:             For Patients with Decision Making Capacity:   DNR    Conversation Outcomes / Follow-Up Plan:   pt to bring in AD

## 2018-07-24 NOTE — PROGRESS NOTES
Immunization/s administered 7/24/2018 by Wendy David LPN with guardian's consent. Patient tolerated procedure well. No reactions noted. Pneumo 23 left deltoid.

## 2018-07-24 NOTE — PROGRESS NOTES
Assessment/Plan:    1. Acute vaginitis  - fluconazole (DIFLUCAN) 150 mg tablet; Take 1 Tab by mouth daily for 1 day. FDA advises cautious prescribing of oral fluconazole in pregnancy. Dispense: 1 Tab; Refill: 0    2. Nocturia and leuk in urine. Send for culture, treat based on results  - AMB POC URINALYSIS DIP STICK AUTO W/O MICRO  -urine culture    3. Medicare annual wellness visit, subsequent  - METABOLIC PANEL, COMPREHENSIVE; Future  - LIPID PANEL; Future  - CBC W/O DIFF; Future    4. Mixed hypercholesterolemia and hypertriglyceridemia  - METABOLIC PANEL, COMPREHENSIVE; Future  - LIPID PANEL; Future    5. Elevated blood sugar  - HEMOGLOBIN A1C W/O EAG; Future    6. Essential hypertension  -cont current  - METABOLIC PANEL, COMPREHENSIVE; Future  - LIPID PANEL; Future  - CBC W/O DIFF; Future  - dilTIAZem CD (CARTIA XT) 240 mg ER capsule; TAKE 1 CAPSULE EVERY DAY  Dispense: 90 Cap; Refill: 1    7. Encounter for immunization  - ADMIN PNEUMOCOCCAL VACCINE  Medicare Injection Admin Charge  - Pneumococcal Polysaccharide vaccine, 23-Valent, Adult or Immunocompromised    8. Full code status  - FULL CODE    9. Sinus tachycardia vs  PAF (paroxysmal atrial fibrillation) ([facility] [address]  -managed by Vasiliy Ang    The plan was discussed with the patient. The patient verbalized understanding and is in agreement with the plan. All medication potential side effects were discussed with the patient.     Health Maintenance:   Health Maintenance   Topic Date Due    Pneumococcal 65+ Low/Medium Risk (2 of 2 - PPSV23) 07/17/2018    Influenza Age 5 to Adult  08/01/2018    COLON CANCER SCRN (BARIUM / CT COLO / FLX SIG) Q5  04/02/2019    MEDICARE YEARLY EXAM  07/25/2019    GLAUCOMA SCREENING Q2Y  08/01/2019    DTaP/Tdap/Td series (2 - Td) 05/23/2027    Hepatitis C Screening  Completed    Bone Densitometry (Dexa) Screening  Completed    ZOSTER VACCINE AGE 60>  Completed       Kenyetta Castillo is a 76 y.o. female and presents with Annual Wellness Visit     Subjective:  Pt c/o nocturia x 1 week. No dysuria. Also c/o vaginal itching and discharge. PAF- has loop recorder done with Dr. Bernarda Snowden to eval for PAF. No incidence of AF yet. On eliquis for stroke prevention. HTN - bp good. HR mildly increased. ROS:  Constitutional: No recent weight change. No weakness/fatigue. No f/c. Cardiovascular: No CP/palpitations. No STAHL/orthopnea/PND. Respiratory: No cough/sputum, dyspnea, wheezing. Gastointestinal: No dysphagia, reflux. No n/v. No constipation/diarrhea. No melena/rectal bleeding. Genitourinary: No dysuria, urinary hesitancy, +nocturia, no hematuria. No incontinence. The problem list was updated as a part of today's visit. Patient Active Problem List   Diagnosis Code    History of breast cancer Z85.3    Mixed hypercholesterolemia and hypertriglyceridemia E78.2    Elevated blood sugar R73.9    Essential hypertension I10    Sinus tachycardia R00.0    S/P bilateral mastectomy Z90.13    Vitamin D deficiency E55.9    PAF (paroxysmal atrial fibrillation) (HCC) I48.0       The PSH, FH were reviewed. SH:  Social History   Substance Use Topics    Smoking status: Never Smoker    Smokeless tobacco: Never Used    Alcohol use Yes      Comment: social        Medications/Allergies:  Current Outpatient Prescriptions on File Prior to Visit   Medication Sig Dispense Refill    atorvastatin (LIPITOR) 40 mg tablet TAKE 1 TABLET EVERY DAY 90 Tab 3    valsartan-hydroCHLOROthiazide (DIOVAN-HCT) 320-25 mg per tablet Take 1 Tab by mouth daily. 90 Tab 1    fluticasone furoate (ARNUITY ELLIPTA) 100 mcg/actuation dsdv inhaler Take 1 Puff by inhalation daily.  albuterol (PROVENTIL HFA, VENTOLIN HFA, PROAIR HFA) 90 mcg/actuation inhaler Take 2 Puffs by inhalation every four (4) hours as needed. 1 Inhaler 1    Pttlrimo-Xxle-Vadopn-Hyalur Ac 679-364-97-2 mg cap Take 1 Tab by mouth daily.       MULTIVITAMIN WITH MINERALS (MULTIVITAMIN & MINERAL FORMULA PO) Take 1 Tab by mouth daily.  ALPRAZolam (XANAX) 0.5 mg tablet Take 1 Tab by mouth daily. Max Daily Amount: 0.5 mg. 30 Tab 0     No current facility-administered medications on file prior to visit. Allergies   Allergen Reactions    Biaxin [Clarithromycin] Unknown (comments)     \"spacey\" feeling      Oxycodone-Acetaminophen Itching    Sulfa (Sulfonamide Antibiotics) Other (comments)     Headaches        Objective:  Visit Vitals    /68 (BP 1 Location: Left arm, BP Patient Position: Sitting)    Pulse (!) 110    Temp 97.8 °F (36.6 °C) (Oral)    Resp 20    Ht 5' 2\" (1.575 m)    Wt 190 lb (86.2 kg)    SpO2 96%    BMI 34.75 kg/m2      Constitutional: Well developed, nourished, no distress, alert, obese habitus   CV: S1, S2.  Reg, tachy. No murmurs/rubs. No thrills palpated. No carotid bruits. Intact distal pulses. No edema. No aortic bruits. Pulm: No abnormalities on inspection. Clear to auscultation bilaterally. No wheezing/rhonchi. Normal effort. GI: Soft, nontender, nondistended. Normal active bowel sounds. MS: Gait normal.  Joints without deformity/tenderness. Strength intact bilateral upper and lower ext. Normal ROM all extremities. Neuro: A/O x 3. No focal motor or sensory deficits. Speech normal.   Skin: No lesions/rashes on inspection. Psych: Appropriate affect, judgement and insight. Short-term memory intact. Urine dipstick shows negative for nitrites, red blood cells, positive for leukocytes. This is the Subsequent Medicare Annual Wellness Exam, performed 12 months or more after the Initial AWV or the last Subsequent AWV    I have reviewed the patient's medical history in detail and updated the computerized patient record.      History     Past Medical History:   Diagnosis Date    Arthritis     Asthma     Cancer ([facility] [address] 2013    breast cancer     Hypercholesterolemia     Hypertension       Past Surgical History:   Procedure Laterality Date    BREAST SURGERY PROCEDURE UNLISTED  06/01/2013    HX CHOLECYSTECTOMY  1995    HX GYN  06/2013    masectomoy and reconstruction    HX GYN  2005    lap hysterectomy and lift     HX HEENT  1962    tonsillectomy    HX ORTHOPAEDIC  11/01/2016    Dr Michaela Marshall finger ganglion cyst     HX ORTHOPAEDIC  1999    bunion surgery      Current Outpatient Prescriptions   Medication Sig Dispense Refill    atorvastatin (LIPITOR) 40 mg tablet TAKE 1 TABLET EVERY DAY 90 Tab 3    valsartan-hydroCHLOROthiazide (DIOVAN-HCT) 320-25 mg per tablet Take 1 Tab by mouth daily. 90 Tab 1    fluticasone furoate (ARNUITY ELLIPTA) 100 mcg/actuation dsdv inhaler Take 1 Puff by inhalation daily.  CARTIA  mg ER capsule TAKE 1 CAPSULE EVERY DAY 90 Cap 1    albuterol (PROVENTIL HFA, VENTOLIN HFA, PROAIR HFA) 90 mcg/actuation inhaler Take 2 Puffs by inhalation every four (4) hours as needed. 1 Inhaler 1    Prouznpt-Ozar-Mjvsjb-Hyalur Ac 219-413-72-2 mg cap Take 1 Tab by mouth daily.  MULTIVITAMIN WITH MINERALS (MULTIVITAMIN & MINERAL FORMULA PO) Take 1 Tab by mouth daily.  ALPRAZolam (XANAX) 0.5 mg tablet Take 1 Tab by mouth daily. Max Daily Amount: 0.5 mg. 30 Tab 0    montelukast (SINGULAIR) 10 mg tablet Take 10 mg by mouth daily.  aspirin 81 mg chewable tablet Take 81 mg by mouth daily.        Allergies   Allergen Reactions    Biaxin [Clarithromycin] Unknown (comments)     \"spacey\" feeling      Oxycodone-Acetaminophen Itching    Sulfa (Sulfonamide Antibiotics) Other (comments)     Headaches      Family History   Problem Relation Age of Onset    Hypertension Mother     Kidney Disease Mother     Cancer Father     Heart Disease Brother      Social History   Substance Use Topics    Smoking status: Never Smoker    Smokeless tobacco: Never Used    Alcohol use Yes      Comment: social      Patient Active Problem List   Diagnosis Code    History of breast cancer Z85.3    Mixed hypercholesterolemia and hypertriglyceridemia E78.2    Elevated blood sugar R73.9    Essential hypertension I10    Sinus tachycardia R00.0    S/P bilateral mastectomy Z90.13    Vitamin D deficiency E55.9    PAF (paroxysmal atrial fibrillation) ([facility]) I48.0       Depression Risk Factor Screening:     PHQ over the last two weeks 7/24/2018   Little interest or pleasure in doing things Not at all   Feeling down, depressed, irritable, or hopeless Not at all   Total Score PHQ 2 0     Alcohol Risk Factor Screening: You do not drink alcohol or very rarely. Functional Ability and Level of Safety:   Hearing Loss  Hearing is good. Activities of Daily Living  The home contains: no safety equipment. Patient does total self care    Fall Risk  Fall Risk Assessment, last 12 mths 7/24/2018   Able to walk? Yes   Fall in past 12 months? No       Abuse Screen  Patient is not abused    Cognitive Screening   Evaluation of Cognitive Function:  Has your family/caregiver stated any concerns about your memory: no  Normal    Patient Care Team   Patient Care Team:  Alecia Marie MD as PCP - General (Internal Medicine)  Marycarmen Lewis MD (Cardiology)  Jon Lew MD (Allergy)  Jessica Gonzáles MD (Orthopedic Surgery)  Basilia Longoria MD (Pulmonary Disease)  Valeria Lopez MD (Dermatology)    Assessment/Plan   Education and counseling provided:  Are appropriate based on today's review and evaluation  End-of-Life planning (with patient's consent)    Diagnoses and all orders for this visit:    1. Medicare annual wellness visit, subsequent  -     METABOLIC PANEL, COMPREHENSIVE; Future  -     LIPID PANEL; Future  -     CBC W/O DIFF; Future    2. Mixed hypercholesterolemia and hypertriglyceridemia  -     METABOLIC PANEL, COMPREHENSIVE; Future  -     LIPID PANEL; Future    3. Elevated blood sugar  -     HEMOGLOBIN A1C W/O EAG; Future    4. Essential hypertension  -     METABOLIC PANEL, COMPREHENSIVE;  Future  -     LIPID PANEL; Future  -     CBC W/O DIFF; Future  -     dilTIAZem CD (CARTIA XT) 240 mg ER capsule; TAKE 1 CAPSULE EVERY DAY    5. Nocturia  -     AMB POC URINALYSIS DIP STICK AUTO W/O MICRO    6. Encounter for immunization  -     ADMIN PNEUMOCOCCAL VACCINE  Medicare Injection Admin Charge  -     Pneumococcal Polysaccharide vaccine, 23-Valent, Adult or Immunocompromised    7. Full code status  -     FULL CODE    8. Sinus tachycardia    9. Acute vaginitis  -     fluconazole (DIFLUCAN) 150 mg tablet; Take 1 Tab by mouth daily for 1 day. FDA advises cautious prescribing of oral fluconazole in pregnancy.         Health Maintenance Due   Topic Date Due    Pneumococcal 65+ Low/Medium Risk (2 of 2 - PPSV23) 07/17/2018

## 2018-07-24 NOTE — PATIENT INSTRUCTIONS
Medicare Wellness Visit, Female    The best way to live healthy is to have a lifestyle where you eat a well-balanced diet, exercise regularly, limit alcohol use, and quit all forms of tobacco/nicotine, if applicable. Regular preventive services are another way to keep healthy. Preventive services (vaccines, screening tests, monitoring & exams) can help personalize your care plan, which helps you manage your own care. Screening tests can find health problems at the earliest stages, when they are easiest to treat. [facility] follows the current, evidence-based guidelines published by the [facility] ([facility]) when recommending preventive services for our patients. Because we follow these guidelines, sometimes recommendations change over time as research supports it. (For example, mammograms used to be recommended annually. Even though Medicare will still pay for an annual mammogram, the newer guidelines recommend a mammogram every two years for women of average risk.)    Of course, you and your provider may decide to screen more often for some diseases, based on your risk and co-morbidities (chronic disease you are already diagnosed with). Preventive services for you include:    - Medicare offers their members a free annual wellness visit, which is time for you and your primary care provider to discuss and plan for your preventive service needs. Take advantage of this benefit every year!    -All people over age 72 should receive the recommended pneumonia vaccines. Current USPSTF guidelines recommend a series of two vaccines for the best pneumonia protection.     -All adults should have a yearly flu vaccine and a tetanus vaccine every 10 years. All adults age 61 years should receive a shingles vaccine once in their lifetime.      -A bone mass density test is recommended when a woman turns 65 to screen for osteoporosis.  This test is only recommended once as a screening. Some providers will use this same test as a disease monitoring tool if you already have osteoporosis. -All adults age 38-68 years who are overweight should have a diabetes screening test once every three years.     -Other screening tests & preventive services for persons with diabetes include: an eye exam to screen for diabetic retinopathy, a kidney function test, a foot exam, and stricter control over your cholesterol.     -Cardiovascular screening for adults with routine risk involves an electrocardiogram (ECG) at intervals determined by the provider.     -Colorectal cancer screenings should be done for adults age 54-65 years with normal risk. There are a number of acceptable methods of screening for this type of cancer. Each test has its own benefits and drawbacks. Discuss with your provider what is most appropriate for you during your annual wellness visit. The different tests include: colonoscopy (considered the best screening method), a fecal occult blood test, a fecal DNA test, and sigmoidoscopy. -Breast cancer screenings are recommended every other year for women of normal risk age 54-69 years.     -Cervical cancer screenings for women over age 72 are only recommended with certain risk factors.     -All adults born between [facility] should be screened once for Hepatitis C. Here is a list of your current Health Maintenance items (your personalized list of preventive services) with a due date:  Health Maintenance Due   Topic Date Due    Annual Well Visit  07/08/2018    Pneumococcal Vaccine (2 of 2 - PPSV23) 07/17/2018    Glaucoma Screening   08/01/2018        Advance Directives: Care Instructions  Your Care Instructions  An advance directive is a legal way to state your wishes at the end of your life. It tells your family and your doctor what to do if you can no longer say what you want. There are two main types of advance directives.  You can change them any time that your wishes change. · A living will tells your family and your doctor your wishes about life support and other treatment. · A durable power of  for health care lets you name a person to make treatment decisions for you when you can't speak for yourself. This person is called a health care agent. If you do not have an advance directive, decisions about your medical care may be made by a doctor or a  who doesn't know you. It may help to think of an advance directive as a gift to the people who care for you. If you have one, they won't have to make tough decisions by themselves. Follow-up care is a key part of your treatment and safety. Be sure to make and go to all appointments, and call your doctor if you are having problems. It's also a good idea to know your test results and keep a list of the medicines you take. How can you care for yourself at home? · Discuss your wishes with your loved ones and your doctor. This way, there are no surprises. · Many states have a unique form. Or you might use a universal form that has been approved by many states. This kind of form can sometimes be completed and stored online. Your electronic copy will then be available wherever you have a connection to the Internet. In most cases, doctors will respect your wishes even if you have a form from a different state. · You don't need a  to do an advance directive. But you may want to get legal advice. · Think about these questions when you prepare an advance directive:  ¨ Who do you want to make decisions about your medical care if you are not able to? Many people choose a family member or close friend. ¨ Do you know enough about life support methods that might be used? If not, talk to your doctor so you understand. ¨ What are you most afraid of that might happen? You might be afraid of having pain, losing your independence, or being kept alive by machines. ¨ Where would you prefer to die?  Choices include your home, a hospital, or a nursing home. ¨ Would you like to have information about hospice care to support you and your family? ¨ Do you want to donate organs when you die? ¨ Do you want certain Hinduism practices performed before you die? If so, put your wishes in the advance directive. · Read your advance directive every year, and make changes as needed. When should you call for help? Be sure to contact your doctor if you have any questions. Where can you learn more? Go to http://maria e-yosef.info/. Enter R264 in the search box to learn more about \"Advance Directives: Care Instructions. \"  Current as of: October 6, 2017  Content Version: 11.7  © 8427-3686 [employer]. Care instructions adapted under license by [employer] (which disclaims liability or warranty for this information). If you have questions about a medical condition or this instruction, always ask your healthcare professional. [address] any warranty or liability for your use of this information.

## 2018-07-24 NOTE — PROGRESS NOTES
Jose Roberto Bartholomew is a 76 y.o. female (: 1950) presenting to address:    Chief Complaint   Patient presents with    Annual Wellness Visit       Vitals:    18 0928   BP: 110/68   Pulse: (!) 110   Resp: 20   Temp: 97.8 °F (36.6 °C)   TempSrc: Oral   SpO2: 96%   Weight: 190 lb (86.2 kg)   Height: 5' 2\" (1.575 m)   PainSc:   5   PainLoc: Generalized       Hearing/Vision:      Visual Acuity Screening    Right eye Left eye Both eyes   Without correction:      With correction: 20/20 20/25 20/20       Learning Assessment:     Learning Assessment 2017   PRIMARY LEARNER Patient   HIGHEST LEVEL OF EDUCATION - PRIMARY LEARNER  4 YEARS OF COLLEGE   BARRIERS PRIMARY LEARNER NONE   CO-LEARNER CAREGIVER No   PRIMARY LANGUAGE ENGLISH    NEED No   LEARNER PREFERENCE PRIMARY READING   ANSWERED BY self   RELATIONSHIP SELF     Depression Screening:     PHQ over the last two weeks 2018   Little interest or pleasure in doing things Not at all   Feeling down, depressed, irritable, or hopeless Not at all   Total Score PHQ 2 0     Fall Risk Assessment:     Fall Risk Assessment, last 12 mths 2018   Able to walk? Yes   Fall in past 12 months? No     Abuse Screening:     Abuse Screening Questionnaire 2017   Do you ever feel afraid of your partner? N   Are you in a relationship with someone who physically or mentally threatens you? N   Is it safe for you to go home? Y     Coordination of Care Questionaire:   1. Have you been to the ER, urgent care clinic since your last visit? Hospitalized since your last visit? YES [facility]  2018    2. Have you seen or consulted any other health care providers outside of the [address] [place] [address] since your last visit? Include any pap smears or colon screening. YES Cardiology 7/10/18    Advanced Directive:   1. Do you have an Advanced Directive? YES    2. Would you like information on Advanced Directives?  NO

## 2018-07-24 NOTE — MR AVS SNAPSHOT
Yaakov Stahl 
 
 
 [address] [address] [facility] [address] [phone] Patient: Merry Jennings MRN: [id number]:[id number] Visit Information Date & Time Provider Department Dept. Phone Encounter #  
 7/24/2018  9:30 AM Martin Bautista, [address] [phone] [id number] Upcoming Health Maintenance Date Due Pneumococcal 65+ Low/Medium Risk (2 of 2 - PPSV23) 7/17/2018 Influenza Age 5 to Adult 8/1/2018 COLON CANCER SCRN (BARIUM / CT COLO / FLX SIG) Q5 4/2/2019 MEDICARE YEARLY EXAM 7/25/2019 GLAUCOMA SCREENING Q2Y 8/1/2019 DTaP/Tdap/Td series (2 - Td) 5/23/2027 Allergies as of 7/24/2018  Review Complete On: 7/24/2018 By: Martin Bautista MD  
  
 Severity Noted Reaction Type Reactions Biaxin [Clarithromycin] Medium 05/23/2017    Unknown (comments) \"spacey\" feeling Oxycodone-acetaminophen  05/29/2013    Itching Sulfa (Sulfonamide Antibiotics) Low 05/23/2017    Other (comments) Headaches Current Immunizations  Never Reviewed Name Date Pneumococcal Conjugate (PCV-13) 7/17/2017 Pneumococcal Polysaccharide (PPSV-23)  Incomplete, 7/11/2013 Zoster Vaccine, Live 8/23/2012 Not reviewed this visit You Were Diagnosed With   
  
 Codes Comments Medicare annual wellness visit, subsequent    -  Primary ICD-10-CM: Z00.00 ICD-9-CM: V70.0 Mixed hypercholesterolemia and hypertriglyceridemia     ICD-10-CM: E78.2 ICD-9-CM: 272.2 Elevated blood sugar     ICD-10-CM: R73.9 ICD-9-CM: 790.29 Essential hypertension     ICD-10-CM: I10 
ICD-9-CM: 401.9 Nocturia     ICD-10-CM: R35.1 ICD-9-CM: 788.43 Encounter for immunization     ICD-10-CM: D73 ICD-9-CM: V03.89 Full code status     ICD-10-CM: Z78.9 ICD-9-CM: V49.89 Sinus tachycardia     ICD-10-CM: R00.0 ICD-9-CM: 427.89 Vitals BP Pulse Temp Resp Height(growth percentile) Weight(growth percentile) 110/68 (BP 1 Location: Left arm, BP Patient Position: Sitting) (!) 110 97.8 °F (36.6 °C) (Oral) 20 5' 2\" (1.575 m) 190 lb (86.2 kg) SpO2 BMI OB Status Smoking Status 96% 34.75 kg/m2 Postmenopausal Never Smoker Vitals History BMI and BSA Data Body Mass Index Body Surface Area 34.75 kg/m 2 1.94 m 2 Preferred Pharmacy Pharmacy Name Phone Юлия Christopher [address] [place] [phone] [facility] [address] [phone] Your Updated Medication List  
  
   
This list is accurate as of 7/24/18  9:53 AM.  Always use your most recent med list.  
  
  
  
  
 albuterol 90 mcg/actuation inhaler Commonly known as:  PROVENTIL HFA, VENTOLIN HFA, PROAIR HFA Take 2 Puffs by inhalation every four (4) hours as needed. ALPRAZolam 0.5 mg tablet Commonly known as:  Willis Curio Take 1 Tab by mouth daily. Max Daily Amount: 0.5 mg.  
  
 apixaban 5 mg tablet Commonly known as:  Sherlean [place] Take 1 Tab by mouth two (2) times a day. ARNUITY ELLIPTA 100 mcg/actuation Dsdv inhaler Generic drug:  fluticasone furoate Take 1 Puff by inhalation daily. atorvastatin 40 mg tablet Commonly known as:  LIPITOR  
TAKE 1 TABLET EVERY DAY  
  
 dilTIAZem  mg ER capsule Commonly known as:  CARTIA XT  
TAKE 1 CAPSULE EVERY DAY Zleonhhr-Kwbu-Mhdbxf-Hyalur Ac 578-087-75-2 mg Cap Take 1 Tab by mouth daily. MULTIVITAMIN & MINERAL FORMULA PO Take 1 Tab by mouth daily. valsartan-hydroCHLOROthiazide 320-25 mg per tablet Commonly known as:  DIOVAN-HCT Take 1 Tab by mouth daily. Prescriptions Sent to Pharmacy Refills  
 dilTIAZem CD (CARTIA XT) 240 mg ER capsule 1 Sig: TAKE 1 CAPSULE EVERY DAY Class: Normal  
 Pharmacy: [address] [place] [address], [address]  #: [phone] We Performed the Following ADMIN PNEUMOCOCCAL VACCINE [ [facility]] AMB POC URINALYSIS DIP STICK AUTO W/O MICRO [27522 CPT(R)] FULL CODE [COD2 Custom] PNEUMOCOCCAL POLYSACCHARIDE VACCINE, 23-VALENT, ADULT OR IMMUNOSUPPRESSED PT DOSE, [65933 CPT(R)] To-Do List   
 07/24/2018 Lab:  CBC W/O DIFF   
  
 07/24/2018 Lab:  HEMOGLOBIN A1C W/O EAG   
  
 07/24/2018 Lab:  LIPID PANEL   
  
 07/24/2018 Lab:  METABOLIC PANEL, COMPREHENSIVE Patient Instructions Medicare Wellness Visit, Female The best way to live healthy is to have a lifestyle where you eat a well-balanced diet, exercise regularly, limit alcohol use, and quit all forms of tobacco/nicotine, if applicable. Regular preventive services are another way to keep healthy. Preventive services (vaccines, screening tests, monitoring & exams) can help personalize your care plan, which helps you manage your own care. Screening tests can find health problems at the earliest stages, when they are easiest to treat. [facility] follows the current, evidence-based guidelines published by the [facility] Rashard Rider (USPSTF) when recommending preventive services for our patients. Because we follow these guidelines, sometimes recommendations change over time as research supports it. (For example, mammograms used to be recommended annually. Even though Medicare will still pay for an annual mammogram, the newer guidelines recommend a mammogram every two years for women of average risk.) Of course, you and your provider may decide to screen more often for some diseases, based on your risk and co-morbidities (chronic disease you are already diagnosed with). Preventive services for you include: - Medicare offers their members a free annual wellness visit, which is time for you and your primary care provider to discuss and plan for your preventive service needs. Take advantage of this benefit every year! -All people over age 72 should receive the recommended pneumonia vaccines. Current USPSTF guidelines recommend a series of two vaccines for the best pneumonia protection.  
 
-All adults should have a yearly flu vaccine and a tetanus vaccine every 10 years. All adults age 61 years should receive a shingles vaccine once in their lifetime.   
 
-A bone mass density test is recommended when a woman turns 65 to screen for osteoporosis. This test is only recommended once as a screening. Some providers will use this same test as a disease monitoring tool if you already have osteoporosis. -All adults age 38-68 years who are overweight should have a diabetes screening test once every three years.  
 
-Other screening tests & preventive services for persons with diabetes include: an eye exam to screen for diabetic retinopathy, a kidney function test, a foot exam, and stricter control over your cholesterol.  
 
-Cardiovascular screening for adults with routine risk involves an electrocardiogram (ECG) at intervals determined by the provider.  
 
-Colorectal cancer screenings should be done for adults age 54-65 years with normal risk. There are a number of acceptable methods of screening for this type of cancer. Each test has its own benefits and drawbacks. Discuss with your provider what is most appropriate for you during your annual wellness visit. The different tests include: colonoscopy (considered the best screening method), a fecal occult blood test, a fecal DNA test, and sigmoidoscopy. -Breast cancer screenings are recommended every other year for women of normal risk age 54-69 years.  
 
-Cervical cancer screenings for women over age 72 are only recommended with certain risk factors.  
 
-All adults born between [facility] should be screened once for Hepatitis C.   
 
Here is a list of your current Health Maintenance items (your personalized list of preventive services) with a due date: 
Health Maintenance Due  
 Topic Date Due  
 Annual Well Visit  07/08/2018  Pneumococcal Vaccine (2 of 2 - PPSV23) 07/17/2018  Glaucoma Screening   08/01/2018 Advance Directives: Care Instructions Your Care Instructions An advance directive is a legal way to state your wishes at the end of your life. It tells your family and your doctor what to do if you can no longer say what you want. There are two main types of advance directives. You can change them any time that your wishes change. · A living will tells your family and your doctor your wishes about life support and other treatment. · A durable power of  for health care lets you name a person to make treatment decisions for you when you can't speak for yourself. This person is called a health care agent. If you do not have an advance directive, decisions about your medical care may be made by a doctor or a  who doesn't know you. It may help to think of an advance directive as a gift to the people who care for you. If you have one, they won't have to make tough decisions by themselves. Follow-up care is a key part of your treatment and safety. Be sure to make and go to all appointments, and call your doctor if you are having problems. It's also a good idea to know your test results and keep a list of the medicines you take. How can you care for yourself at home? · Discuss your wishes with your loved ones and your doctor. This way, there are no surprises. · Many states have a unique form. Or you might use a universal form that has been approved by many states. This kind of form can sometimes be completed and stored online. Your electronic copy will then be available wherever you have a connection to the Internet. In most cases, doctors will respect your wishes even if you have a form from a different state. · You don't need a  to do an advance directive. But you may want to get legal advice. · Think about these questions when you prepare an advance directive: ¨ Who do you want to make decisions about your medical care if you are not able to? Many people choose a family member or close friend. ¨ Do you know enough about life support methods that might be used? If not, talk to your doctor so you understand. ¨ What are you most afraid of that might happen? You might be afraid of having pain, losing your independence, or being kept alive by machines. ¨ Where would you prefer to die? Choices include your home, a hospital, or a nursing home. ¨ Would you like to have information about hospice care to support you and your family? ¨ Do you want to donate organs when you die? ¨ Do you want certain Buddhist practices performed before you die? If so, put your wishes in the advance directive. · Read your advance directive every year, and make changes as needed. When should you call for help? Be sure to contact your doctor if you have any questions. Where can you learn more? Go to http://maria e-yosef.info/. Enter R264 in the search box to learn more about \"Advance Directives: Care Instructions. \" Current as of: October 6, 2017 Content Version: 11.7 © 6356-4747 [employer], [employer]. Care instructions adapted under license by [employer] (which disclaims liability or warranty for this information). If you have questions about a medical condition or this instruction, always ask your healthcare professional. [place] [address] any warranty or liability for your use of this information. Introducing [facility]! Dear Neelima Nagel: Thank you for requesting a [facility] account. Our records indicate that you already have an active [facility] account. You can access your account anytime at [email or web address] Did you know that you can access your hospital and ER discharge instructions at any time in Ph.Creative? You can also review all of your test results from your hospital stay or ER visit. Additional Information If you have questions, please visit the Frequently Asked Questions section of the [employer] website at [email or web address] Remember, [employer] is NOT to be used for urgent needs. For medical emergencies, dial 911. Now available from your iPhone and Android! Please provide this summary of care documentation to your next provider. Your primary care clinician is listed as Arden Salinas. If you have any questions after today's visit, please call [phone].

## 2018-07-24 NOTE — ACP (ADVANCE CARE PLANNING)
Advance Care Planning (ACP) Provider Conversation Snapshot    Date of ACP Conversation: 07/24/18  Persons included in Conversation:  patient  Length of ACP Conversation in minutes:  <16 minutes (Non-Billable)    Authorized Decision Maker (if patient is incapable of making informed decisions):    This person is:             For Patients with Decision Making Capacity:   full code, DNR for coma    Conversation Outcomes / Follow-Up Plan:   Recommended completion of Advance Directive form after review of ACP materials and conversation with prospective healthcare agent

## 2018-07-25 LAB
BACTERIA SPEC CULT: ABNORMAL
BACTERIA SPEC CULT: ABNORMAL
SERVICE CMNT-IMP: ABNORMAL

## 2018-07-25 RX ORDER — LOSARTAN POTASSIUM AND HYDROCHLOROTHIAZIDE 25; 100 MG/1; MG/1
1 TABLET ORAL DAILY
Qty: 30 TAB | Refills: 0 | Status: SHIPPED | OUTPATIENT
Start: 2018-07-25 | End: 2018-09-04 | Stop reason: SDUPTHER

## 2018-08-03 ENCOUNTER — HOSPITAL ENCOUNTER (OUTPATIENT)
Dept: LAB | Age: 68
Discharge: HOME | End: 2018-08-03
Payer: MEDICARE

## 2018-08-03 DIAGNOSIS — R73.9 ELEVATED BLOOD SUGAR: ICD-10-CM

## 2018-08-03 DIAGNOSIS — E78.2 MIXED HYPERCHOLESTEROLEMIA AND HYPERTRIGLYCERIDEMIA: ICD-10-CM

## 2018-08-03 DIAGNOSIS — I10 ESSENTIAL HYPERTENSION: ICD-10-CM

## 2018-08-03 DIAGNOSIS — Z00.00 MEDICARE ANNUAL WELLNESS VISIT, SUBSEQUENT: ICD-10-CM

## 2018-08-03 LAB
ALBUMIN SERPL-MCNC: 3.3 G/DL (ref 3.4–5)
ALBUMIN/GLOB SERPL: 1 {RATIO} (ref 0.8–1.7)
ALP SERPL-CCNC: 112 U/L (ref 45–117)
ALT SERPL-CCNC: 25 U/L (ref 13–56)
ANION GAP SERPL CALC-SCNC: 10 MMOL/L (ref 3–18)
AST SERPL-CCNC: 9 U/L (ref 15–37)
BILIRUB SERPL-MCNC: 0.3 MG/DL (ref 0.2–1)
BUN SERPL-MCNC: 14 MG/DL (ref 7–18)
BUN/CREAT SERPL: 19 (ref 12–20)
CALCIUM SERPL-MCNC: 9.6 MG/DL (ref 8.5–10.1)
CHLORIDE SERPL-SCNC: 96 MMOL/L (ref 100–108)
CHOLEST SERPL-MCNC: 263 MG/DL
CO2 SERPL-SCNC: 32 MMOL/L (ref 21–32)
CREAT SERPL-MCNC: 0.75 MG/DL (ref 0.6–1.3)
ERYTHROCYTE [DISTWIDTH] IN BLOOD BY AUTOMATED COUNT: 14.8 % (ref 11.6–14.5)
GLOBULIN SER CALC-MCNC: 3.3 G/DL (ref 2–4)
GLUCOSE SERPL-MCNC: 120 MG/DL (ref 74–99)
HBA1C MFR BLD: 5.9 % (ref 4.2–5.6)
HCT VFR BLD AUTO: 35.9 % (ref 35–45)
HDLC SERPL-MCNC: 111 MG/DL (ref 40–60)
HDLC SERPL: 2.4 {RATIO} (ref 0–5)
HGB BLD-MCNC: 11.9 G/DL (ref 12–16)
LDLC SERPL CALC-MCNC: 134 MG/DL (ref 0–100)
LIPID PROFILE,FLP: ABNORMAL
MCH RBC QN AUTO: 31.9 PG (ref 24–34)
MCHC RBC AUTO-ENTMCNC: 33.1 G/DL (ref 31–37)
MCV RBC AUTO: 96.2 FL (ref 74–97)
PLATELET # BLD AUTO: 436 K/UL (ref 135–420)
PMV BLD AUTO: 8.9 FL (ref 9.2–11.8)
POTASSIUM SERPL-SCNC: 3.6 MMOL/L (ref 3.5–5.5)
PROT SERPL-MCNC: 6.6 G/DL (ref 6.4–8.2)
RBC # BLD AUTO: 3.73 M/UL (ref 4.2–5.3)
SODIUM SERPL-SCNC: 138 MMOL/L (ref 136–145)
TRIGL SERPL-MCNC: 90 MG/DL (ref ?–150)
VLDLC SERPL CALC-MCNC: 18 MG/DL
WBC # BLD AUTO: 10.5 K/UL (ref 4.6–13.2)

## 2018-08-03 PROCEDURE — 36415 COLL VENOUS BLD VENIPUNCTURE: CPT | Performed by: INTERNAL MEDICINE

## 2018-08-03 PROCEDURE — 83036 HEMOGLOBIN GLYCOSYLATED A1C: CPT | Performed by: INTERNAL MEDICINE

## 2018-08-03 PROCEDURE — 80053 COMPREHEN METABOLIC PANEL: CPT | Performed by: INTERNAL MEDICINE

## 2018-08-03 PROCEDURE — 80061 LIPID PANEL: CPT | Performed by: INTERNAL MEDICINE

## 2018-08-03 PROCEDURE — 85027 COMPLETE CBC AUTOMATED: CPT | Performed by: INTERNAL MEDICINE

## 2018-09-04 NOTE — TELEPHONE ENCOUNTER
Pt called to request a refill because she will be out before her next appt.  Please advise     Future Appointments  Date Time Provider Shelton Godoy   9/14/2018 11:30 AM Rhea Hudson MD [facility]

## 2018-09-05 RX ORDER — LOSARTAN POTASSIUM AND HYDROCHLOROTHIAZIDE 25; 100 MG/1; MG/1
1 TABLET ORAL DAILY
Qty: 30 TAB | Refills: 0 | Status: SHIPPED | OUTPATIENT
Start: 2018-09-05 | End: 2018-09-24 | Stop reason: ALTCHOICE

## 2018-09-24 ENCOUNTER — OFFICE VISIT (OUTPATIENT)
Dept: FAMILY MEDICINE CLINIC | Age: 68
End: 2018-09-24

## 2018-09-24 VITALS
BODY MASS INDEX: 34.96 KG/M2 | OXYGEN SATURATION: 97 % | SYSTOLIC BLOOD PRESSURE: 130 MMHG | TEMPERATURE: 98.6 F | RESPIRATION RATE: 20 BRPM | DIASTOLIC BLOOD PRESSURE: 72 MMHG | HEART RATE: 95 BPM | WEIGHT: 190 LBS | HEIGHT: 62 IN

## 2018-09-24 DIAGNOSIS — I10 ESSENTIAL HYPERTENSION: ICD-10-CM

## 2018-09-24 DIAGNOSIS — Z23 ENCOUNTER FOR IMMUNIZATION: ICD-10-CM

## 2018-09-24 RX ORDER — CANDESARTAN CILEXETIL AND HYDROCHLOROTHIAZIDE 32; 12.5 MG/1; MG/1
1 TABLET ORAL DAILY
Qty: 90 TAB | Refills: 0 | Status: SHIPPED | OUTPATIENT
Start: 2018-09-24 | End: 2018-10-04 | Stop reason: ALTCHOICE

## 2018-09-24 RX ORDER — VALSARTAN AND HYDROCHLOROTHIAZIDE 320; 25 MG/1; MG/1
1 TABLET, FILM COATED ORAL DAILY
Qty: 90 TAB | Refills: 1 | Status: SHIPPED | OUTPATIENT
Start: 2018-09-24 | End: 2019-03-06 | Stop reason: SDUPTHER

## 2018-09-24 NOTE — PROGRESS NOTES
Assessment/Plan: 1. Essential hypertension 
-pt to try to obtain non-recalled valsartan. If not, trial candesartan. F/u in a few months if on candesartan 
- valsartan-hydroCHLOROthiazide (DIOVAN-HCT) 320-25 mg per tablet; Take 1 Tab by mouth daily. Dispense: 90 Tab; Refill: 1 2. Encounter for immunization - Influenza Vaccine Inactivated (IIV)(FLUAD), Subunit, Adjuvanted, IM, (47095) - Administration fee () for Medicare insured patients The plan was discussed with the patient. The patient verbalized understanding and is in agreement with the plan. All medication potential side effects were discussed with the patient. Health Maintenance:  
Health Maintenance Topic Date Due  Influenza Age 5 to Adult  08/01/2018  COLON CANCER SCRN (BARIUM / CT COLO / FLX SIG) Q5  04/02/2019  MEDICARE YEARLY EXAM  07/25/2019  GLAUCOMA SCREENING Q2Y  08/01/2019  
 DTaP/Tdap/Td series (2 - Td) 05/23/2027  Hepatitis C Screening  Completed  Bone Densitometry (Dexa) Screening  Completed  ZOSTER VACCINE AGE 60>  Completed  Pneumococcal 65+ Low/Medium Risk  Completed Trina Sebastian is a 76 y.o. female and presents with Hypertension Subjective: HTN - was doing well on valsartan, but it had been recalled. Changed to losartan, but having fatigue and palpitations. Feels it's related to the change in med. ROS: 
Constitutional: No recent weight change. No weakness/fatigue. No f/c. HENT: No HA, dizziness. No hearing loss/tinnitus. No nasal congestion/discharge. Eyes: No change in vision, double/blurred vision or eye pain/redness. Cardiovascular: No CP/+palpitations. No STAHL/orthopnea/PND. Respiratory: No cough/sputum, dyspnea, wheezing. Gastointestinal: No dysphagia, reflux. No n/v. No constipation/diarrhea. No melena/rectal bleeding. The problem list was updated as a part of today's visit. Patient Active Problem List  
Diagnosis Code  History of breast cancer Z85.3  Mixed hypercholesterolemia and hypertriglyceridemia E78.2  Elevated blood sugar R73.9  
 Essential hypertension I10  
 Sinus tachycardia R00.0  S/P bilateral mastectomy Z90.13  Vitamin D deficiency E55.9  
 PAF (paroxysmal atrial fibrillation) (HCC) I48.0 The PSH, FH were reviewed. SH: Social History Substance Use Topics  Smoking status: Never Smoker  Smokeless tobacco: Never Used  Alcohol use Yes Comment: social   
 
 
Medications/Allergies: 
Current Outpatient Prescriptions on File Prior to Visit Medication Sig Dispense Refill  losartan-hydroCHLOROthiazide (HYZAAR) 100-25 mg per tablet Take 1 Tab by mouth daily. 30 Tab 0  
 dilTIAZem CD (CARTIA XT) 240 mg ER capsule TAKE 1 CAPSULE EVERY DAY 90 Cap 1  
 apixaban (ELIQUIS) 5 mg tablet Take 1 Tab by mouth two (2) times a day.  atorvastatin (LIPITOR) 40 mg tablet TAKE 1 TABLET EVERY DAY 90 Tab 3  
 fluticasone furoate (ARNUITY ELLIPTA) 100 mcg/actuation dsdv inhaler Take 1 Puff by inhalation daily.  albuterol (PROVENTIL HFA, VENTOLIN HFA, PROAIR HFA) 90 mcg/actuation inhaler Take 2 Puffs by inhalation every four (4) hours as needed. 1 Inhaler 1  
 Dnvpcegx-Qwny-Menwkw-Hyalur Ac 230-154-83-2 mg cap Take 1 Tab by mouth daily.  MULTIVITAMIN WITH MINERALS (MULTIVITAMIN & MINERAL FORMULA PO) Take 1 Tab by mouth daily.  ALPRAZolam (XANAX) 0.5 mg tablet Take 1 Tab by mouth daily. Max Daily Amount: 0.5 mg. 30 Tab 0 No current facility-administered medications on file prior to visit. Allergies Allergen Reactions  Biaxin [Clarithromycin] Unknown (comments) \"spacey\" feeling  Oxycodone-Acetaminophen Itching  Sulfa (Sulfonamide Antibiotics) Other (comments) Headaches Objective: 
Visit Vitals  /72 (BP 1 Location: Left arm, BP Patient Position: Sitting)  Pulse 95  Temp 98.6 °F (37 °C) (Oral)  Resp 20  Ht 5' 2\" (1.575 m)  Wt 190 lb (86.2 kg)  SpO2 97%  BMI 34.75 kg/m2 Constitutional: Well developed, nourished, no distress, alert, obese habitus CV: S1, S2.  RRR. No murmurs/rubs. No thrills palpated. No carotid bruits. Intact distal pulses. No edema. No aortic bruits. Pulm: No abnormalities on inspection. Clear to auscultation bilaterally. No wheezing/rhonchi. Normal effort. Labwork and Ancillary Studies: CBC w/Diff Lab Results Component Value Date/Time WBC 10.5 08/03/2018 08:49 AM  
 HGB 11.9 (L) 08/03/2018 08:49 AM  
 PLATELET 176 (H) 29/67/1350 08:49 AM  
  
 
 Basic Metabolic Profile/LFTs Lab Results Component Value Date/Time Sodium 138 08/03/2018 08:49 AM  
 Potassium 3.6 08/03/2018 08:49 AM  
 Chloride 96 (L) 08/03/2018 08:49 AM  
 CO2 32 08/03/2018 08:49 AM  
 Anion gap 10 08/03/2018 08:49 AM  
 Glucose 120 (H) 08/03/2018 08:49 AM  
 BUN 14 08/03/2018 08:49 AM  
 Creatinine 0.75 08/03/2018 08:49 AM  
 BUN/Creatinine ratio 19 08/03/2018 08:49 AM  
 GFR est AA >60 08/03/2018 08:49 AM  
 GFR est non-AA >60 08/03/2018 08:49 AM  
 Calcium 9.6 08/03/2018 08:49 AM  
  
Lab Results Component Value Date/Time ALT (SGPT) 25 08/03/2018 08:49 AM  
 AST (SGOT) 9 (L) 08/03/2018 08:49 AM  
 Alk. phosphatase 112 08/03/2018 08:49 AM  
 Bilirubin, total 0.3 08/03/2018 08:49 AM  
 
 
Cholesterol Lab Results Component Value Date/Time  Cholesterol, total 263 (H) 08/03/2018 08:49 AM  
 HDL Cholesterol 111 (H) 08/03/2018 08:49 AM  
 LDL, calculated 134 (H) 08/03/2018 08:49 AM  
 Triglyceride 90 08/03/2018 08:49 AM  
 CHOL/HDL Ratio 2.4 08/03/2018 08:49 AM

## 2018-09-24 NOTE — PROGRESS NOTES
Gee Vanegas is a 76 y.o. female (: 1950) presenting to address: Chief Complaint Patient presents with  Hypertension Vitals:  
 18 1012 BP: 130/72 Pulse: 95 Resp: 20 Temp: 98.6 °F (37 °C) TempSrc: Oral  
SpO2: 97% Weight: 190 lb (86.2 kg) Height: 5' 2\" (1.575 m) PainSc:   4 PainLoc: Knee Learning Assessment:  
 
Learning Assessment 2017 PRIMARY LEARNER Patient HIGHEST LEVEL OF EDUCATION - PRIMARY LEARNER  4 YEARS OF COLLEGE  
BARRIERS PRIMARY LEARNER NONE  
CO-LEARNER CAREGIVER No  
PRIMARY LANGUAGE ENGLISH  NEED No  
LEARNER PREFERENCE PRIMARY READING  
ANSWERED BY self RELATIONSHIP SELF Depression Screening: PHQ over the last two weeks 2018 Little interest or pleasure in doing things Not at all Feeling down, depressed, irritable, or hopeless Not at all Total Score PHQ 2 0 Fall Risk Assessment:  
 
Fall Risk Assessment, last 12 mths 2018 Able to walk? Yes Fall in past 12 months? No  
 
Abuse Screening:  
 
Abuse Screening Questionnaire 2017 Do you ever feel afraid of your partner? Genny Chau Are you in a relationship with someone who physically or mentally threatens you? Genny Chau Is it safe for you to go home? Aimee Larsen Coordination of Care Questionaire: 1. Have you been to the ER, urgent care clinic since your last visit? Hospitalized since your last visit? NO 
 
2. Have you seen or consulted any other health care providers outside of the [facility] since your last visit? Include any pap smears or colon screening. YES ortho 2018 Advanced Directive: 1. Do you have an Advanced Directive? YES 
 
2. Would you like information on Advanced Directives?  NO

## 2018-09-24 NOTE — MR AVS SNAPSHOT
[address]  [address] [facility] [address] [phone] Patient: Gee Vanegas MRN: [id number] NLQ:9/[id number] Visit Information Date & Time Provider Department Dept. Phone Encounter #  
 9/24/2018 10:15 AM Jomar Addison MD [address] [place] [address] [phone] [id number] Upcoming Health Maintenance Date Due Influenza Age 5 to Adult 8/1/2018 COLON CANCER SCRN (BARIUM / CT COLO / FLX SIG) Q5 4/2/2019 MEDICARE YEARLY EXAM 7/25/2019 GLAUCOMA SCREENING Q2Y 8/1/2019 DTaP/Tdap/Td series (2 - Td) 5/23/2027 Allergies as of 9/24/2018  Review Complete On: 9/24/2018 By: Jomar Addison MD  
  
 Severity Noted Reaction Type Reactions Biaxin [Clarithromycin] Medium 05/23/2017    Unknown (comments) \"spacey\" feeling Oxycodone-acetaminophen  05/29/2013    Itching Sulfa (Sulfonamide Antibiotics) Low 05/23/2017    Other (comments) Headaches Current Immunizations  Reviewed on 7/24/2018 Name Date Pneumococcal Conjugate (PCV-13) 7/17/2017 Pneumococcal Polysaccharide (PPSV-23) 7/24/2018 10:02 AM, 7/11/2013 Zoster Vaccine, Live 8/23/2012 Not reviewed this visit You Were Diagnosed With   
  
 Codes Comments Essential hypertension     ICD-10-CM: I10 
ICD-9-CM: 401.9 Vitals BP Pulse Temp Resp Height(growth percentile) Weight(growth percentile) 130/72 (BP 1 Location: Left arm, BP Patient Position: Sitting) 95 98.6 °F (37 °C) (Oral) 20 5' 2\" (1.575 m) 190 lb (86.2 kg) SpO2 BMI OB Status Smoking Status 97% 34.75 kg/m2 Postmenopausal Never Smoker Vitals History BMI and BSA Data Body Mass Index Body Surface Area 34.75 kg/m 2 1.94 m 2 [facility] [phone] Your Updated Medication List  
  
   
 This list is accurate as of 9/24/18 10:31 AM.  Always use your most recent med list.  
  
  
  
  
 albuterol 90 mcg/actuation inhaler Commonly known as:  PROVENTIL HFA, VENTOLIN HFA, PROAIR HFA Take 2 Puffs by inhalation every four (4) hours as needed. ALPRAZolam 0.5 mg tablet Commonly known as:  Clinton Curio Take 1 Tab by mouth daily. Max Daily Amount: 0.5 mg.  
  
 apixaban 5 mg tablet Commonly known as:  Sherlean [place] Take 1 Tab by mouth two (2) times a day. ARNUITY ELLIPTA 100 mcg/actuation Dsdv inhaler Generic drug:  fluticasone furoate Take 1 Puff by inhalation daily. atorvastatin 40 mg tablet Commonly known as:  LIPITOR  
TAKE 1 TABLET EVERY DAY  
  
 candesartan-hydroCHLOROthiazide 32-12.5 mg per tablet Commonly known as:  ATACAND HCT Take 1 Tab by mouth daily. dilTIAZem  mg ER capsule Commonly known as:  CARTIA XT  
TAKE 1 CAPSULE EVERY DAY Mmkjlult-Kibx-Gzezsf-Hyalur Ac 171-653-58-2 mg Cap Take 1 Tab by mouth daily. MULTIVITAMIN & MINERAL FORMULA PO Take 1 Tab by mouth daily. valsartan-hydroCHLOROthiazide 320-25 mg per tablet Commonly known as:  DIOVAN-HCT Take 1 Tab by mouth daily. Prescriptions Printed Refills  
 valsartan-hydroCHLOROthiazide (DIOVAN-HCT) 320-25 mg per tablet 1 Sig: Take 1 Tab by mouth daily. Class: Print Route: Oral  
 candesartan-hydroCHLOROthiazide (ATACAND HCT) 32-12.5 mg per tablet 0 Sig: Take 1 Tab by mouth daily. Class: Print Route: Oral  
  
Introducing \[facility]\"" & HEALTH SERVICES! Dear Khushi Ha: Thank you for requesting a [employer] account. Our records indicate that you already have an active [employer] account. You can access your account anytime at [email or web address] Did you know that you can access your hospital and ER discharge instructions at any time in Sp[employer]? You can also review all of your test results from your hospital stay or ER visit. Additional Information If you have questions, please visit the Frequently Asked Questions section of the [employer] website at [email or web address] Remember, Mer[employer] is NOT to be used for urgent needs. For medical emergencies, dial 911. Now available from your iPhone and Android! Please provide this summary of care documentation to your next provider. Your primary care clinician is listed as Arden Salinas. If you have any questions after today's visit, please call [phone].

## 2018-09-24 NOTE — PROGRESS NOTES
Flu shot Immunization/s administered 9/24/2018 by Taylor Lopez LPN with guardian's consent. Patient tolerated procedure well. No reactions noted.

## 2018-10-05 ENCOUNTER — OFFICE VISIT (OUTPATIENT)
Dept: FAMILY MEDICINE CLINIC | Age: 68
End: 2018-10-05

## 2018-10-05 VITALS
BODY MASS INDEX: 35.59 KG/M2 | HEIGHT: 62 IN | RESPIRATION RATE: 18 BRPM | OXYGEN SATURATION: 97 % | TEMPERATURE: 98 F | SYSTOLIC BLOOD PRESSURE: 138 MMHG | DIASTOLIC BLOOD PRESSURE: 74 MMHG | HEART RATE: 117 BPM | WEIGHT: 193.4 LBS

## 2018-10-05 DIAGNOSIS — M54.16 LUMBAR RADICULAR PAIN: Primary | ICD-10-CM

## 2018-10-05 RX ORDER — METAXALONE 400 MG/1
400 TABLET ORAL
Qty: 20 TAB | Refills: 0 | Status: SHIPPED | OUTPATIENT
Start: 2018-10-05 | End: 2018-11-14

## 2018-10-05 RX ORDER — PREDNISONE 10 MG/1
TABLET ORAL
Qty: 21 TAB | Refills: 0 | Status: SHIPPED | OUTPATIENT
Start: 2018-10-05 | End: 2020-10-28

## 2018-10-05 NOTE — PROGRESS NOTES
Latosha Kang is a 76 y.o. female (: 1950) presenting to address:  Patient has already received the flu vaccine. Chief Complaint   Patient presents with    Pain (Chronic)     tailbone and left leg. Vitals:    10/05/18 1430   BP: 138/74   Pulse: (!) 117   Resp: 18   Temp: 98 °F (36.7 °C)   TempSrc: Oral   SpO2: 97%   Weight: 193 lb 6.4 oz (87.7 kg)   Height: 5' 2\" (1.575 m)   PainSc:   6   PainLoc: Back       Hearing/Vision:   No exam data present    Learning Assessment:     Learning Assessment 2017   PRIMARY LEARNER Patient   HIGHEST LEVEL OF EDUCATION - PRIMARY LEARNER  4 YEARS OF COLLEGE   BARRIERS PRIMARY LEARNER NONE   CO-LEARNER CAREGIVER No   PRIMARY LANGUAGE ENGLISH    NEED No   LEARNER PREFERENCE PRIMARY READING   ANSWERED BY self   RELATIONSHIP SELF     Depression Screening:     PHQ over the last two weeks 2018   Little interest or pleasure in doing things Not at all   Feeling down, depressed, irritable, or hopeless Not at all   Total Score PHQ 2 0     Fall Risk Assessment:     Fall Risk Assessment, last 12 mths 2018   Able to walk? Yes   Fall in past 12 months? No     Abuse Screening:     Abuse Screening Questionnaire 2017   Do you ever feel afraid of your partner? N   Are you in a relationship with someone who physically or mentally threatens you? N   Is it safe for you to go home? Y     Coordination of Care Questionaire:   1. Have you been to the ER, urgent care clinic since your last visit? Hospitalized since your last visit? NO    2. Have you seen or consulted any other health care providers outside of the [facility] since your last visit? Include any pap smears or colon screening. NO    Advanced Directive:   1. Do you have an Advanced Directive? YES    2. Would you like information on Advanced Directives?  NO

## 2018-10-05 NOTE — PROGRESS NOTES
Assessment/Plan:    1. Lumbar radicular pain  -home exercise. Cont nsaids. Add predpak and skelaxin at night. RTO if no better. - predniSONE (STERAPRED DS) 10 mg dose pack; See administration instruction per 10mg dose pack  Dispense: 21 Tab; Refill: 0  - metaxalone (SKELAXIN) 400 mg tablet; Take 1 Tab by mouth nightly as needed. Dispense: 20 Tab; Refill: 0    The plan was discussed with the patient. The patient verbalized understanding and is in agreement with the plan. All medication potential side effects were discussed with the patient. Health Maintenance:   Health Maintenance   Topic Date Due    Shingrix Vaccine Age 49> (1 of 2) 02/13/2000    COLON CANCER SCRN (BARIUM / CT COLO / FLX SIG) Q5  04/02/2019    MEDICARE YEARLY EXAM  07/25/2019    GLAUCOMA SCREENING Q2Y  08/01/2019    DTaP/Tdap/Td series (3 - Td) 05/23/2027    Hepatitis C Screening  Completed    Bone Densitometry (Dexa) Screening  Completed    Pneumococcal 65+ Low/Medium Risk  Completed    Influenza Age 5 to Adult  Completed       Stepan Bryant is a 76 y.o. female and presents with Pain (Chronic) (tailbone and left leg. )     Subjective:  Pt c/o lumbar radicular pain with radiation down LLE x 5 days. Has also been taking aleve and doing alternating hot/cold. No weakness. ROS:  Constitutional: No recent weight change. No weakness/fatigue. No f/c. Cardiovascular: No CP/palpitations. No STAHL/orthopnea/PND. Respiratory: No cough/sputum, dyspnea, wheezing. Gastointestinal: No dysphagia, reflux. No n/v. No constipation/diarrhea. No melena/rectal bleeding. Genitourinary: No dysuria, urinary hesitancy, nocturia, hematuria. No incontinence. Musculoskeletal: No joint pain/stiffness. No muscle pain/tenderness. Neurological: No seizures/numbness/weakness. No paresthesias. The problem list was updated as a part of today's visit.   Patient Active Problem List   Diagnosis Code    History of breast cancer Z85.3    Mixed hypercholesterolemia and hypertriglyceridemia E78.2    Elevated blood sugar R73.9    Essential hypertension I10    Sinus tachycardia R00.0    S/P bilateral mastectomy Z90.13    Vitamin D deficiency E55.9    PAF (paroxysmal atrial fibrillation) (HCC) I48.0       The PSH, FH were reviewed. SH:  Social History   Substance Use Topics    Smoking status: Never Smoker    Smokeless tobacco: Never Used    Alcohol use Yes      Comment: social        Medications/Allergies:  Current Outpatient Prescriptions on File Prior to Visit   Medication Sig Dispense Refill    valsartan-hydroCHLOROthiazide (DIOVAN-HCT) 320-25 mg per tablet Take 1 Tab by mouth daily. 90 Tab 1    dilTIAZem CD (CARTIA XT) 240 mg ER capsule TAKE 1 CAPSULE EVERY DAY 90 Cap 1    apixaban (ELIQUIS) 5 mg tablet Take 1 Tab by mouth two (2) times a day.  atorvastatin (LIPITOR) 40 mg tablet TAKE 1 TABLET EVERY DAY 90 Tab 3    fluticasone furoate (ARNUITY ELLIPTA) 100 mcg/actuation dsdv inhaler Take 1 Puff by inhalation daily.  albuterol (PROVENTIL HFA, VENTOLIN HFA, PROAIR HFA) 90 mcg/actuation inhaler Take 2 Puffs by inhalation every four (4) hours as needed. 1 Inhaler 1    Hydlhbku-Envh-Vdlvwe-Hyalur Ac 530-800-32-2 mg cap Take 1 Tab by mouth daily.  MULTIVITAMIN WITH MINERALS (MULTIVITAMIN & MINERAL FORMULA PO) Take 1 Tab by mouth daily.  ALPRAZolam (XANAX) 0.5 mg tablet Take 1 Tab by mouth daily. Max Daily Amount: 0.5 mg. 30 Tab 0     No current facility-administered medications on file prior to visit.          Allergies   Allergen Reactions    Biaxin [Clarithromycin] Unknown (comments)     \"spacey\" feeling      Oxycodone-Acetaminophen Itching    Sulfa (Sulfonamide Antibiotics) Other (comments)     Headaches        Objective:  Visit Vitals    /74 (BP 1 Location: Left arm, BP Patient Position: Sitting)    Pulse (!) 117    Temp 98 °F (36.7 °C) (Oral)    Resp 18    Ht 5' 2\" (1.575 m)    Wt 193 lb 6.4 oz (87.7 kg)    SpO2 97%    BMI 35.37 kg/m2      Constitutional: Well developed, nourished, no distress, alert, obese habitus   CV: S1, S2.  RRR. No murmurs/rubs. No thrills palpated. No carotid bruits. Intact distal pulses. No edema. No aortic bruits. Pulm: No abnormalities on inspection. Clear to auscultation bilaterally. No wheezing/rhonchi. Normal effort. MS: Gait normal.  Joints without deformity/tenderness. Strength intact bilateral upper and lower ext. Normal ROM all extremities. Neg straight leg raise bilat. Neuro: A/O x 3. No focal motor or sensory deficits. Speech normal.     Labwork and Ancillary Studies:    CBC w/Diff  Lab Results   Component Value Date/Time    WBC 10.5 08/03/2018 08:49 AM    HGB 11.9 (L) 08/03/2018 08:49 AM    PLATELET 641 (H) 15/33/9537 08:49 AM         Basic Metabolic Profile/LFTs  Lab Results   Component Value Date/Time    Sodium 138 08/03/2018 08:49 AM    Potassium 3.6 08/03/2018 08:49 AM    Chloride 96 (L) 08/03/2018 08:49 AM    CO2 32 08/03/2018 08:49 AM    Anion gap 10 08/03/2018 08:49 AM    Glucose 120 (H) 08/03/2018 08:49 AM    BUN 14 08/03/2018 08:49 AM    Creatinine 0.75 08/03/2018 08:49 AM    BUN/Creatinine ratio 19 08/03/2018 08:49 AM    GFR est AA >60 08/03/2018 08:49 AM    GFR est non-AA >60 08/03/2018 08:49 AM    Calcium 9.6 08/03/2018 08:49 AM      Lab Results   Component Value Date/Time    ALT (SGPT) 25 08/03/2018 08:49 AM    AST (SGOT) 9 (L) 08/03/2018 08:49 AM    Alk.  phosphatase 112 08/03/2018 08:49 AM    Bilirubin, total 0.3 08/03/2018 08:49 AM       Cholesterol  Lab Results   Component Value Date/Time    Cholesterol, total 263 (H) 08/03/2018 08:49 AM    HDL Cholesterol 111 (H) 08/03/2018 08:49 AM    LDL, calculated 134 (H) 08/03/2018 08:49 AM    Triglyceride 90 08/03/2018 08:49 AM    CHOL/HDL Ratio 2.4 08/03/2018 08:49 AM

## 2018-10-05 NOTE — PATIENT INSTRUCTIONS
Sciatica: Exercises  Your Care Instructions  Here are some examples of typical rehabilitation exercises for your condition. Start each exercise slowly. Ease off the exercise if you start to have pain. Your doctor or physical therapist will tell you when you can start these exercises and which ones will work best for you. When you are not being active, find a comfortable position for rest. Some people are comfortable on the floor or a medium-firm bed with a small pillow under their head and another under their knees. Some people prefer to lie on their side with a pillow between their knees. Don't stay in one position for too long. Take short walks (10 to 20 minutes) every 2 to 3 hours. Avoid slopes, hills, and stairs until you feel better. Walk only distances you can manage without pain, especially leg pain. How to do the exercises  Back stretches    1. Get down on your hands and knees on the floor. 2. Relax your head and allow it to droop. Round your back up toward the ceiling until you feel a nice stretch in your upper, middle, and lower back. Hold this stretch for as long as it feels comfortable, or about 15 to 30 seconds. 3. Return to the starting position with a flat back while you are on your hands and knees. 4. Let your back sway by pressing your stomach toward the floor. Lift your buttocks toward the ceiling. 5. Hold this position for 15 to 30 seconds. 6. Repeat 2 to 4 times. Follow-up care is a key part of your treatment and safety. Be sure to make and go to all appointments, and call your doctor if you are having problems. It's also a good idea to know your test results and keep a list of the medicines you take. Where can you learn more? Go to http://maria e-yosef.info/. Enter X805 in the search box to learn more about \"Sciatica: Exercises. \"  Current as of: November 29, 2017  Content Version: 11.8  © 0645-0216 Healthwise, Incorporated.  Care instructions adapted under license by [address] (which disclaims liability or warranty for this information). If you have questions about a medical condition or this instruction, always ask your healthcare professional. [address] any warranty or liability for your use of this information. Piriformis Syndrome: Exercises  Your Care Instructions  Here are some examples of typical rehabilitation exercises for your condition. Start each exercise slowly. Ease off the exercise if you start to have pain. Your doctor or physical therapist will tell you when you can start these exercises and which ones will work best for you. How to do the exercises  Hip rotator stretch    7. Lie on your back with both knees bent and your feet flat on the floor. 8. Put the ankle of your affected leg on your opposite thigh near your knee. 9. Use your hand to gently push your knee (on your affected leg) away from your body until you feel a gentle stretch around your hip. 10. Hold the stretch for 15 to 30 seconds. 11. Repeat 2 to 4 times. 12. Switch legs and repeat steps 1 through 5. Piriformis stretch    1. Lie on your back with your legs straight. 2. Lift your affected leg and bend your knee. With your opposite hand, reach across your body, and then gently pull your knee toward your opposite shoulder. 3. Hold the stretch for 15 to 30 seconds. 4. Repeat with your other leg. 5. Repeat 2 to 4 times on each side. Lower abdominal strengthening    1. Lie on your back with your knees bent and your feet flat on the floor. 2. Tighten your belly muscles by pulling your belly button in toward your spine. 3. Lift one foot off the floor and bring your knee toward your chest, so that your knee is straight above your hip and your leg is bent like the letter \"L. \"  4. Lift the other knee up to the same position. 5. Lower one leg at a time to the starting position.   6. Keep alternating legs until you have lifted each leg 8 to 12 times.  7. Be sure to keep your belly muscles tight and your back still as you are moving your legs. Be sure to breathe normally. Follow-up care is a key part of your treatment and safety. Be sure to make and go to all appointments, and call your doctor if you are having problems. It's also a good idea to know your test results and keep a list of the medicines you take. Where can you learn more? Go to http://maria e-yosef.info/. Enter P150 in the search box to learn more about \"Piriformis Syndrome: Exercises. \"  Current as of: November 29, 2017  Content Version: 11.8  © 1956-5195 Healthwise, [employer] Care instructions adapted under license by [employer] (which disclaims liability or warranty for this information). If you have questions about a medical condition or this instruction, always ask your healthcare professional. [address] any warranty or liability for your use of this information.

## 2018-10-05 NOTE — MR AVS SNAPSHOT
[address] [place] [address] [address]  [address] [facility] [address] [phone] Patient: Chente Almonte MRN: [id number] Visit Information Date & Time Provider Department Dept. Phone Encounter #  
 10/5/2018  2:30 PM Olivia Reyna [phone] [id number] Upcoming Health Maintenance Date Due Shingrix Vaccine Age 50> (1 of 2) 2/13/2000 COLON CANCER SCRN (BARIUM / CT COLO / FLX SIG) Q5 4/2/2019 MEDICARE YEARLY EXAM 7/25/2019 GLAUCOMA SCREENING Q2Y 8/1/2019 DTaP/Tdap/Td series (3 - Td) 5/23/2027 Allergies as of 10/5/2018  Review Complete On: 10/5/2018 By: Yajaira Jacques LPN Severity Noted Reaction Type Reactions Biaxin [Clarithromycin] Medium 05/23/2017    Unknown (comments) \"spacey\" feeling Oxycodone-acetaminophen  05/29/2013    Itching Sulfa (Sulfonamide Antibiotics) Low 05/23/2017    Other (comments) Headaches Current Immunizations  Reviewed on 7/24/2018 Name Date Influenza Vaccine 9/1/2014 12:00 AM, 12/8/2008 12:00 AM  
 Influenza Vaccine (Tri) Adjuvanted 9/24/2018 10:38 AM  
 Pneumococcal Conjugate (PCV-13) 7/17/2017 12:00 AM  
 Pneumococcal Polysaccharide (PPSV-23) 7/24/2018 10:02 AM, 7/11/2013 12:00 AM  
 Pneumococcal Vaccine (Unspecified Type) 6/1/2014 12:00 AM, 5/1/2013 12:00 AM  
 Tdap 5/1/2013 12:00 AM, 1/1/2013 12:00 AM  
 Varicella Virus Vaccine 1/1/2013 12:00 AM  
 Zoster Vaccine, Live 8/23/2012 12:00 AM  
  
 Not reviewed this visit You Were Diagnosed With   
  
 Codes Comments Lumbar radicular pain    -  Primary ICD-10-CM: M54.16 
ICD-9-CM: 724.4 Vitals BP Pulse Temp Resp Height(growth percentile) Weight(growth percentile) 138/74 (BP 1 Location: Left arm, BP Patient Position: Sitting) (!) 117 98 °F (36.7 °C) (Oral) 18 5' 2\" (1.575 m) 193 lb 6.4 oz (87.7 kg) SpO2 BMI OB Status Smoking Status 97% 35.37 kg/m2 Postmenopausal Never Smoker Vitals History BMI and BSA Data Body Mass Index Body Surface Area  
 35.37 kg/m 2 1.96 m 2 [phone] Your Updated Medication List  
  
   
This list is accurate as of 10/5/18  2:41 PM.  Always use your most recent med list.  
  
  
  
  
 albuterol 90 mcg/actuation inhaler Commonly known as:  PROVENTIL HFA, VENTOLIN HFA, PROAIR HFA Take 2 Puffs by inhalation every four (4) hours as needed. ALPRAZolam 0.5 mg tablet Commonly known as:  Lavera Scrivener Take 1 Tab by mouth daily. Max Daily Amount: 0.5 mg.  
  
 apixaban 5 mg tablet Commonly known as:  Perkins Kuster Take 1 Tab by mouth two (2) times a day. ARNUITY ELLIPTA 100 mcg/actuation Dsdv inhaler Generic drug:  fluticasone furoate Take 1 Puff by inhalation daily. atorvastatin 40 mg tablet Commonly known as:  LIPITOR  
TAKE 1 TABLET EVERY DAY  
  
 dilTIAZem  mg ER capsule Commonly known as:  CARTIA XT  
TAKE 1 CAPSULE EVERY DAY Zcqyddkh-Gmvw-Doatbb-Hyalur Ac 404-333-16-2 mg Cap Take 1 Tab by mouth daily. metaxalone 400 mg tablet Commonly known as:  SKELAXIN Take 1 Tab by mouth nightly as needed. MULTIVITAMIN & MINERAL FORMULA PO Take 1 Tab by mouth daily. predniSONE 10 mg dose pack Commonly known as:  STERAPRED DS See administration instruction per 10mg dose pack  
  
 valsartan-hydroCHLOROthiazide 320-25 mg per tablet Commonly known as:  DIOVAN-HCT Take 1 Tab by mouth daily. Prescriptions Sent to Pharmacy Refills  
 predniSONE (STERAPRED DS) 10 mg dose pack 0 Sig: See administration instruction per 10mg dose pack Class: Normal  
 Pharmacy: [address] [facility], [address] [place] [facility] Ph #: [phone] metaxalone (SKELAXIN) 400 mg tablet 0 Sig: Take 1 Tab by mouth nightly as needed. Class: Normal  
 Pharmacy: [address] [facility], [address] [place] [facility] #: [phone] Route: Oral  
  
Patient Instructions Sciatica: Exercises Your Care Instructions Here are some examples of typical rehabilitation exercises for your condition. Start each exercise slowly. Ease off the exercise if you start to have pain. Your doctor or physical therapist will tell you when you can start these exercises and which ones will work best for you. When you are not being active, find a comfortable position for rest. Some people are comfortable on the floor or a medium-firm bed with a small pillow under their head and another under their knees. Some people prefer to lie on their side with a pillow between their knees. Don't stay in one position for too long. Take short walks (10 to 20 minutes) every 2 to 3 hours. Avoid slopes, hills, and stairs until you feel better. Walk only distances you can manage without pain, especially leg pain. How to do the exercises Back stretches 1. Get down on your hands and knees on the floor. 2. Relax your head and allow it to droop. Round your back up toward the ceiling until you feel a nice stretch in your upper, middle, and lower back. Hold this stretch for as long as it feels comfortable, or about 15 to 30 seconds. 3. Return to the starting position with a flat back while you are on your hands and knees. 4. Let your back sway by pressing your stomach toward the floor. Lift your buttocks toward the ceiling. 5. Hold this position for 15 to 30 seconds. 6. Repeat 2 to 4 times. Follow-up care is a key part of your treatment and safety. Be sure to make and go to all appointments, and call your doctor if you are having problems. It's also a good idea to know your test results and keep a list of the medicines you take. Where can you learn more? Go to http://maria e-yosef.info/. Enter G048 in the search box to learn more about \"Sciatica: Exercises. \" Current as of: November 29, 2017 Content Version: 11.8 © 9846-0557 [employer]. Care instructions adapted under license by [employer] (which disclaims liability or warranty for this information). If you have questions about a medical condition or this instruction, always ask your healthcare professional. [place] [address] any warranty or liability for your use of this information. Piriformis Syndrome: Exercises Your Care Instructions Here are some examples of typical rehabilitation exercises for your condition. Start each exercise slowly. Ease off the exercise if you start to have pain. Your doctor or physical therapist will tell you when you can start these exercises and which ones will work best for you. How to do the exercises Hip rotator stretch 7. Lie on your back with both knees bent and your feet flat on the floor. 8. Put the ankle of your affected leg on your opposite thigh near your knee. 9. Use your hand to gently push your knee (on your affected leg) away from your body until you feel a gentle stretch around your hip. 10. Hold the stretch for 15 to 30 seconds. 11. Repeat 2 to 4 times. 12. Switch legs and repeat steps 1 through 5. Piriformis stretch 1. Lie on your back with your legs straight. 2. Lift your affected leg and bend your knee. With your opposite hand, reach across your body, and then gently pull your knee toward your opposite shoulder. 3. Hold the stretch for 15 to 30 seconds. 4. Repeat with your other leg. 5. Repeat 2 to 4 times on each side. Lower abdominal strengthening 1. Lie on your back with your knees bent and your feet flat on the floor. 2. Tighten your belly muscles by pulling your belly button in toward your spine. 3. Lift one foot off the floor and bring your knee toward your chest, so that your knee is straight above your hip and your leg is bent like the letter \"L. \" 
4. Lift the other knee up to the same position. 5. Lower one leg at a time to the starting position. 6. Keep alternating legs until you have lifted each leg 8 to 12 times. 7. Be sure to keep your belly muscles tight and your back still as you are moving your legs. Be sure to breathe normally. Follow-up care is a key part of your treatment and safety. Be sure to make and go to all appointments, and call your doctor if you are having problems. It's also a good idea to know your test results and keep a list of the medicines you take. Where can you learn more? Go to http://maria e-yosef.info/. Enter O951 in the search box to learn more about \"Piriformis Syndrome: Exercises. \" Current as of: November 29, 2017 Content Version: 11.8 © 4767-6529 [employer]. Care instructions adapted under license by [employer] (which disclaims liability or warranty for this information). If you have questions about a medical condition or this instruction, always ask your healthcare professional. [address] any warranty or liability for your use of this information. Introducing [facility]! Dear Terrell Avila: Thank you for requesting a BallLogic account. Our records indicate that you already have an active BallLogic account. You can access your account anytime at [email or web address] Did you know that you can access your hospital and ER discharge instructions at any time in BallLogic? You can also review all of your test results from your hospital stay or ER visit. Additional Information If you have questions, please visit the Frequently Asked Questions section of the [email or web address] website at [email or web address] Remember, Volt is NOT to be used for urgent needs. For medical emergencies, dial 911. Now available from your iPhone and Android! Please provide this summary of care documentation to your next provider. Your primary care clinician is listed as Arden Salinas. If you have any questions after today's visit, please call [phone].

## 2018-10-15 DIAGNOSIS — Z78.0 POSTMENOPAUSAL: Primary | ICD-10-CM

## 2018-10-15 PROBLEM — M85.89 OSTEOPENIA OF MULTIPLE SITES: Status: ACTIVE | Noted: 2018-10-15

## 2018-11-07 DIAGNOSIS — Z78.0 POSTMENOPAUSAL: ICD-10-CM

## 2018-11-14 RX ORDER — CYCLOBENZAPRINE HCL 10 MG
10 TABLET ORAL
Qty: 90 TAB | Refills: 0 | Status: SHIPPED | OUTPATIENT
Start: 2018-11-14 | End: 2020-10-28

## 2019-03-05 DIAGNOSIS — I10 ESSENTIAL HYPERTENSION: ICD-10-CM

## 2019-03-05 RX ORDER — VALSARTAN AND HYDROCHLOROTHIAZIDE 320; 25 MG/1; MG/1
TABLET, FILM COATED ORAL
Qty: 90 TAB | Refills: 1 | Status: CANCELLED | OUTPATIENT
Start: 2019-03-05

## 2019-03-06 DIAGNOSIS — I10 ESSENTIAL HYPERTENSION: ICD-10-CM

## 2019-03-06 RX ORDER — VALSARTAN AND HYDROCHLOROTHIAZIDE 320; 25 MG/1; MG/1
1 TABLET, FILM COATED ORAL DAILY
Qty: 90 TAB | Refills: 0 | Status: SHIPPED | OUTPATIENT
Start: 2019-03-06 | End: 2019-03-06 | Stop reason: SDUPTHER

## 2019-03-06 RX ORDER — DILTIAZEM HYDROCHLORIDE 240 MG/1
CAPSULE, COATED, EXTENDED RELEASE ORAL
Qty: 90 CAP | Refills: 1 | Status: SHIPPED | OUTPATIENT
Start: 2019-03-06

## 2019-03-06 RX ORDER — VALSARTAN AND HYDROCHLOROTHIAZIDE 320; 25 MG/1; MG/1
1 TABLET, FILM COATED ORAL DAILY
Qty: 90 TAB | Refills: 1 | Status: SHIPPED | OUTPATIENT
Start: 2019-03-06 | End: 2020-10-28

## 2020-03-24 ENCOUNTER — TELEPHONE (OUTPATIENT)
Dept: FAMILY MEDICINE CLINIC | Age: 70
End: 2020-03-24